# Patient Record
Sex: MALE | Race: WHITE | Employment: PART TIME | URBAN - METROPOLITAN AREA
[De-identification: names, ages, dates, MRNs, and addresses within clinical notes are randomized per-mention and may not be internally consistent; named-entity substitution may affect disease eponyms.]

---

## 2018-08-27 ENCOUNTER — OFFICE VISIT (OUTPATIENT)
Dept: FAMILY MEDICINE | Facility: CLINIC | Age: 53
End: 2018-08-27

## 2018-08-27 VITALS
WEIGHT: 172 LBS | BODY MASS INDEX: 24.62 KG/M2 | OXYGEN SATURATION: 97 % | TEMPERATURE: 98.4 F | SYSTOLIC BLOOD PRESSURE: 113 MMHG | HEART RATE: 93 BPM | HEIGHT: 70 IN | DIASTOLIC BLOOD PRESSURE: 71 MMHG

## 2018-08-27 DIAGNOSIS — Z12.11 SPECIAL SCREENING FOR MALIGNANT NEOPLASMS, COLON: ICD-10-CM

## 2018-08-27 DIAGNOSIS — Z11.59 NEED FOR HEPATITIS C SCREENING TEST: ICD-10-CM

## 2018-08-27 DIAGNOSIS — Z13.220 LIPID SCREENING: ICD-10-CM

## 2018-08-27 DIAGNOSIS — Z23 NEED FOR SHINGLES VACCINE: ICD-10-CM

## 2018-08-27 DIAGNOSIS — Z00.00 ROUTINE GENERAL MEDICAL EXAMINATION AT A HEALTH CARE FACILITY: Primary | ICD-10-CM

## 2018-08-27 LAB
ANION GAP SERPL CALCULATED.3IONS-SCNC: 8 MMOL/L (ref 3–14)
BUN SERPL-MCNC: 16 MG/DL (ref 7–30)
CALCIUM SERPL-MCNC: 9.4 MG/DL (ref 8.5–10.1)
CHLORIDE SERPL-SCNC: 102 MMOL/L (ref 94–109)
CHOLEST SERPL-MCNC: 244 MG/DL
CO2 SERPL-SCNC: 30 MMOL/L (ref 20–32)
CREAT SERPL-MCNC: 0.92 MG/DL (ref 0.66–1.25)
GFR SERPL CREATININE-BSD FRML MDRD: 85 ML/MIN/1.7M2
GLUCOSE SERPL-MCNC: 103 MG/DL (ref 70–99)
HDLC SERPL-MCNC: 46 MG/DL
LDLC SERPL CALC-MCNC: 145 MG/DL
NONHDLC SERPL-MCNC: 198 MG/DL
POTASSIUM SERPL-SCNC: 4.2 MMOL/L (ref 3.4–5.3)
SODIUM SERPL-SCNC: 139 MMOL/L (ref 133–144)
TRIGL SERPL-MCNC: 264 MG/DL

## 2018-08-27 NOTE — PATIENT INSTRUCTIONS
Welcome to [facility], where we are committed to the art of inspired primary care.  Thank you for choosing us to be a part of your well-being.    The clinic is open Monday through Friday, 8:00 a.m. - 5:00 p.m., and Saturdays from 8:00 a.m. - 12:00 p.m.  After-hours questions are directed to our 24-hour nurse line, which can be reached by calling the clinic at [phone].  You can also contact the clinic through [employer], our online patient portal.  (Please allow 1-2 business days for a response via [employer].)  If you are not already enrolled in [employer], access instructions are below.    If you need a refill on your prescription, please contact the pharmacy where you filled it, and they will contact our clinic with the details of what is needed.  If your prescription is a controlled substance, you will have a conversation with your provider to determine if you would like to  your prescription at the clinic or have it mailed to your pharmacy.  Please allow 2-3 business days for all refill requests to be handled.    We look forward to providing you with great care!  Please let me know if you have any questions.  Thank you for allowing me to participate in your care.  It was my pleasure meeting you.  Katherine Mathews PA-C    Preventive Health Recommendations  Male Ages 50 - 64    Yearly exam:             See your health care provider every year in order to  o   Review health changes.   o   Discuss preventive care.    o   Review your medicines if your doctor has prescribed any.     Have a cholesterol test every 5 years, or more frequently if you are at risk for high cholesterol/heart disease.     Have a diabetes test (fasting glucose) every three years. If you are at risk for diabetes, you should have this test more often.     Have a colonoscopy at age 50, or have a yearly FIT test (stool test). These exams will check for colon cancer.      Talk with your health care provider about  whether or not a prostate cancer screening test (PSA) is right for you.    You should be tested each year for STDs (sexually transmitted diseases), if you re at risk.     Shots: Get a flu shot each year. Get a tetanus shot every 10 years.     Nutrition:    Eat at least 5 servings of fruits and vegetables daily.     Eat whole-grain bread, whole-wheat pasta and brown rice instead of white grains and rice.     Get adequate Calcium and Vitamin D.     Lifestyle    Exercise for at least 150 minutes a week (30 minutes a day, 5 days a week). This will help you control your weight and prevent disease.     Limit alcohol to one drink per day.     No smoking.     Wear sunscreen to prevent skin cancer.     See your dentist every six months for an exam and cleaning.     See your eye doctor every 1 to 2 years.

## 2018-08-27 NOTE — LETTER
September 4, 2018      Remington Orr  [address]  [facility] [address]              Dear Remington,    Below are your recent lab results. Hepatitis C screen and basic metabolic profile look just fine.    Your lipid profile is high which may increase your risk for heart disease. Below is your cardiac risk score.  This is a calculated risk of developing a cardiac event over the next 10 years.  Though I do not recommend medication at this time I do recommend lifestyle modifications such as a heart healthy diet, regular exercise and weight management for control.  We should also check this again in 6-12 months. I have enclosed further information.    Please make an appointment in clinic if you would like to discuss this further.  Feel free to send me a [employer] message if you have questions as well.    Thank you for allowing me to participate in your care.  It was my pleasure meeting you.      Sincerely,          Diann Mathews PA-C      The 10-year ASCVD risk score (Didier MAHARAJ Jr, et al., 2013) is: 5.6%    Values used to calculate the score:      Age: 53 years      Sex: Male      Is Non- : No      Diabetic: No      Tobacco smoker: No      Systolic Blood Pressure: 113 mmHg      Is BP treated: No      HDL Cholesterol: 46 mg/dL      Total Cholesterol: 244 mg/dL    Results for orders placed or performed in visit on 08/27/18   Hepatitis C Screen Reflex to HCV RNA Quant and Genotype   Result Value Ref Range    Hepatitis C Antibody Nonreactive NR^Nonreactive   Basic metabolic panel   Result Value Ref Range    Sodium 139 133 - 144 mmol/L    Potassium 4.2 3.4 - 5.3 mmol/L    Chloride 102 94 - 109 mmol/L    Carbon Dioxide 30 20 - 32 mmol/L    Anion Gap 8 3 - 14 mmol/L    Glucose 103 (H) 70 - 99 mg/dL    Urea Nitrogen 16 7 - 30 mg/dL    Creatinine 0.92 0.66 - 1.25 mg/dL    GFR Estimate 85 >60 mL/min/1.7m2    GFR Estimate If Black >90 >60 mL/min/1.7m2    Calcium 9.4 8.5 - 10.1 mg/dL   Lipid  Profile   Result Value Ref Range    Cholesterol 244 (H) <200 mg/dL    Triglycerides 264 (H) <150 mg/dL    HDL Cholesterol 46 >39 mg/dL    LDL Cholesterol Calculated 145 (H) <100 mg/dL    Non HDL Cholesterol 198 (H) <130 mg/dL           High Cholesterol: Assessing Your Risk    Have you been told that your cholesterol is too high? If so, you could be heading for a heart attack, also known as acute myocardial infarction (AMI), or stroke. This is especially true if you have other risk factors for heart disease. Get smart about cholesterol and your heart disease risk. This sheet can help you understand your heart disease risk and how your cholesterol level affects it. Talk to your healthcare provider about how to get started controlling your cholesterol.  Why is high cholesterol a problem?  Blood cholesterol is a fatty substance. The body uses it to make membranes in cells and for hormone production. It travels through the bloodstream and is used by the tissues for normal function. When blood cholesterol is high, it forms plaque and causes inflammation. The plaque builds up in the walls of arteries (blood vessels that carry blood from the heart to the body). This narrows the opening for blood flow. Over time, the heart may not get enough oxygen. This can lead to coronary artery disease, heart attack, or stroke.  3 steps to assessing your risk  Step 1. Find your risk factors for heart disease and stroke  How your cholesterol numbers affect your heart health depends on other risk factors for heart attack and stroke. Check off each risk factor below that applies to you:    Are you a man 45 years old or older or a woman 55 years old or older?    Does your family have a history of heart problems before the age of 55 in male relatives or age 65 in female relatives? This includes heart attack, coronary heart disease, or atherosclerosis.    Do you have high blood pressure? Do you take medicine to treat high blood pressure?    Do  you smoke?    Do you have diabetes?    Do you exercise very little or not very often? Recommendations are for 30 minutes of exercise at least 5 days a week. If you are not doing cardiovascular exercise as often as these recommendations, it may not be enough and you may be at higher risk for elevated cholesterol and heart disease.    Do you eat a diet that is high in saturated or trans fats, cholesterol, sugar, or alcohol? You may be at increased risk for heart disease if you do not eat enough fruits, vegetables, lean meats and eat sugars or drink alcohol sparingly.    Have you been told you have high cholesterol? Do you take medicine to control your cholesterol?  Step 2. Test your cholesterol  Have your cholesterol tested every 5 years after the age of 20 and more often if you have risk factors. Cholesterol testing most often needs no preparation. Sometimes you may be asked to fast (not eat) before your test. A blood sample is taken and sent to a lab. There, the amount of cholesterol and triglyceride in your blood is measured. There are 2 types of cholesterol in the sample. The first is HDL ( good cholesterol ). The second is LDL ( bad cholesterol ). Cholesterol test results are most often shown as the total of HDL and LDL cholesterol numbers. You may also be told the separate HDL and LDL cholesterol results.  Fill in your numbers below.  HDL cholesterol:                           LDL cholesterol:                         Total cholesterol:                         Triglyceride:                           Step 3. Discuss the results with your healthcare provider   If your cholesterol levels are higher than normal, your healthcare provider will help you with steps to take to lower your levels. Steps may include lifestyle changes like diet, physical activity, and quitting smoking, and medicine to lower bad cholesterol levels.  If you have high cholesterol, you may need your cholesterol level tested more often to make  "sure your medicine and lifestyle changes are working to reduce your risks of having a heart attack or stroke.   Date Last Reviewed: 6/1/2016 2000-2017 [employer]. [address] [facility], [place] [address]. All rights reserved. This information is not intended as a substitute for professional medical care. Always follow your healthcare professional's instructions.      Controlling Your Cholesterol  Cholesterol is a waxy substance. It travels in your blood through the blood vessels. When you have high cholesterol, it can build up along the walls of the blood vessels. This makes the vessels narrower and decreases blood flow. You are then at greater risk of having a heart attack or a stroke.  Good and bad cholesterol  Lipids are fats, and blood is mostly water. Fat and water don't mix. So our bodies need lipoproteins (lipids inside a protein shell) to carry the lipids. The protein shell carries its lipids through the bloodstream. There are two main kinds of lipoproteins:    LDL (low-density lipoprotein) is known as \"bad cholesterol.\" It mainly carries cholesterol. It delivers this cholesterol to body cells. Excess LDL cholesterol will build up in artery walls. This increases your risk for heart disease and stroke.    HDL (high-density lipoprotein) is known as \"good cholesterol.\" This protein shell collects excess cholesterol that LDLs have left behind on blood vessel walls. That's why high levels of HDL cholesterol can decrease your risk of heart disease and stroke.  Controlling cholesterol levels  Total cholesterol includes LDL and HDL cholesterol, as well as other fats in the bloodstream. If your total cholesterol is high, follow the steps below to help lower your total cholesterol level:  Eat less unhealthy fat    Cut back on saturated fats and trans fats (also called hydrogenated) by selecting lean cuts of meat, low-fat dairy, and using oils instead of solid fats. Limit baked goods, processed " meats, and fried foods. A diet that s high in these fats increases your bad cholesterol. It's not enough to just cut back on foods containing cholesterol.    Eat about 2 servings of fish per week. Most fish contain omega-3 fatty acids. These help lower blood cholesterol.    Eat more whole grains and soluble fiber (such as oat bran). These lower overall cholesterol.  Be active    Choose an activity you enjoy. Walking, swimming, and riding a bike are some good ways to be active.    Start at a level where you feel comfortable. Increase your time and pace a little each week.    Work up to 30 to 40 minutes of moderate to high intensity physical activity at least 3 to 4 days per week.    Remember, some activity is better than none.    If you haven't been exercising regularly, start slowly. Check with your healthcare provider to make sure the exercise plan is right for you.  Quit smoking  Quitting smoking can improve your lipid levels. It also lowers your risk for heart disease and stroke.  Manage your weight  If you are overweight or obese, your healthcare provider will work with you to lose weight and lower your BMI (body mass index) to a normal or near-normal level. Making diet changes and increasing physical activity can help.  Take medicine as directed  Many people need medicine to get their LDL levels to a safe level. Medicine to lower cholesterol levels is effective and safe. Taking medicine is not a substitute for exercise or watching your diet! Your healthcare provider can tell you whether you might benefit from a cholesterol-lowering medicine.  Date Last Reviewed: 6/1/2017 2000-2017 [employer]. [address] [place] [address], [place] [address]. All rights reserved. This information is not intended as a substitute for professional medical care. Always follow your healthcare professional's instructions.

## 2018-08-27 NOTE — NURSING NOTE
Screening Questionnaire for Adult Immunization    Are you sick today?   No   Do you have allergies to medications, food, a vaccine component or latex?   No   Have you ever had a serious reaction after receiving a vaccination?   No   Do you have a long-term health problem with heart disease, lung disease, asthma, kidney disease, metabolic disease (e.g. diabetes), anemia, or other blood disorder?   No   Do you have cancer, leukemia, HIV/AIDS, or any other immune system problem?   No   In the past 3 months, have you taken medications that affect  your immune system, such as prednisone, other steroids, or anticancer drugs; drugs for the treatment of rheumatoid arthritis, Crohn s disease, or psoriasis; or have you had radiation treatments?   No   Have you had a seizure, or a brain or other nervous system problem?   No   During the past year, have you received a transfusion of blood or blood     products, or been given immune (gamma) globulin or antiviral drug?   No   For women: Are you pregnant or is there a chance you could become        pregnant during the next month?   No   Have you received any vaccinations in the past 4 weeks?   No     Immunization questionnaire answers were all negative.        Given by Danni Marquez. Patient instructed to remain in clinic for 15 minutes afterwards, and to report any adverse reaction to me immediately.       Screening performed by Danni Marquez on 8/27/2018 at 11:15 AM.

## 2018-08-27 NOTE — PROGRESS NOTES
SUBJECTIVE:   CC: Remington Orr is an 53 year old male who presents for preventative health visit. He is a new patient to our clinic and has been receiving health care [facility].  His last CPE was Never had one. He has the following concerns they would like addressed today:    1. Preventive care:  Generally feels very healthy. Has not been in to the doctor in a very long time.  [facility] Care Everywhere reviewed. He would like to have Cologard ordered.  2. Colon cancer screening:  Past provider would not order Cologard and he would like to do that for colon cancer screening.  Father  of colon cancer age 76.       Healthy Habits:    Do you get at least three servings of calcium containing foods daily (dairy, green leafy vegetables, etc.)? yes    Amount of exercise or daily activities, outside of work: 1 day(s) per week--rides mountain bike.  Very active for work does a lot of walking.    Problems taking medications regularly No    Medication side effects: No    Have you had an eye exam in the past two years? no    Do you see a dentist twice per year? no    Do you have sleep apnea, excessive snoring or daytime drowsiness?Snores regularly.  Does not wake up patient.  Wakes to urinate 2-3 times nightly. Drinks a lot of water throughout the day and while at night job.    Today's PHQ-2 Score:   PHQ-2 (  [employer]) 2018   Q1: Little interest or pleasure in doing things 0   Q2: Feeling down, depressed or hopeless 0   PHQ-2 Score 0     There are no active problems to display for this patient.      History reviewed. No pertinent past medical history.    Past Surgical History:   Procedure Laterality Date     wisdom teeth         Family History   Problem Relation Age of Onset     Colon Cancer Father 76     Alzheimer Disease Father        Social History   Substance Use Topics     Smoking status: Never Smoker     Smokeless tobacco: Never Used     Alcohol use 1.8 oz/week      3 Standard drinks or equivalent per week      Comment: 3 drinks a week       Social History     Social History Narrative    Works as a  for 20+ years at [employer]. He generally enjoys his work.    Lives with girlfriend for the most part.  Two cats.        Has a good support system.    Feels safe in all environments.    Wears seatbelt 100% of the time    Wears helmet while biking.    Dentist appointment view.     Eye doctor due.  Wears glasses when biking.    Denies history of abuse, past or present, physical, sexual or emotional.    08/27/18    Katherine Mathews PA-C           No current outpatient prescriptions on file.                           Last PSA: No results found for: PSA    Reviewed orders with patient. Reviewed health maintenance and updated orders accordingly - Yes    Reviewed and updated as needed this visit by clinical staff  Tobacco  Allergies  Meds  Problems  Med Hx  Surg Hx  Fam Hx  Soc Hx          Reviewed and updated as needed this visit by Provider  Tobacco  Allergies  Meds  Problems  Med Hx  Surg Hx  Fam Hx  Soc Hx             ROS:  CONSTITUTIONAL: NEGATIVE for fever, chills, change in weight  INTEGUMENTARY/SKIN: NEGATIVE for worrisome rashes, moles or lesions  EYES: NEGATIVE for vision changes or irritation  ENT: NEGATIVE for ear, mouth and throat problems  RESP: NEGATIVE for significant cough or SOB  CV: NEGATIVE for chest pain, palpitations or peripheral edema  GI: NEGATIVE for nausea, abdominal pain, heartburn, or change in bowel habits   male: negative for dysuria, hematuria, decreased urinary stream, erectile dysfunction, urethral discharge  MUSCULOSKELETAL: NEGATIVE for significant arthralgias or myalgia  NEURO: NEGATIVE for weakness, dizziness or paresthesias  ENDOCRINE: NEGATIVE for temperature intolerance, skin/hair changes  HEME/ALLERGY/IMMUNE: NEGATIVE for bleeding problems  PSYCHIATRIC: NEGATIVE for changes in mood or affect    OBJECTIVE:   /71  Pulse 93   "Temp 98.4  F (36.9  C) (Oral)  Ht 5' 10.2\" (178.3 cm)  Wt 172 lb (78 kg)  SpO2 97%  BMI 24.54 kg/m2  EXAM:  GENERAL: healthy, alert and no distress  EYES: Eyes grossly normal to inspection, PERRL and conjunctivae and sclerae normal  HENT: ear canals and TM's normal, nose and mouth without ulcers or lesions  NECK: no adenopathy, no asymmetry, masses, or scars and thyroid normal to palpation.  No bruits  RESP: lungs clear to auscultation - no rales, rhonchi or wheezes  CV: regular rate and rhythm, normal S1 S2, no S3 or S4, no murmur, click or rub, no peripheral edema and peripheral pulses strong  ABDOMEN: soft, nontender, no hepatosplenomegaly, no masses and bowel sounds normal  MS: no gross musculoskeletal defects noted, no clubbing, edema or cyanosis of extremities.  Pulses = and appropriate bilaterally to DP and PT  SKIN: no suspicious lesions or rashes  NEURO: Normal strength and tone, mentation intact and speech normal  PSYCH: mentation appears normal, affect normal/bright  LYMPH: no cervical, supraclavicular, axillary, or inguinal adenopathy      ASSESSMENT/PLAN:       ICD-10-CM    1. Routine general medical examination at a health care facility Z00.00 GASTROENTEROLOGY ADULT REF PROCEDURE ONLY     TDAP VACCINE (BOOSTRIX)     Basic metabolic panel     CANCELED: Lipid Panel ([facility])     CANCELED: Basic Metabolic Panel ([facility])     CANCELED: Prostate spec antigen screen   2. Need for shingles vaccine Z23    3. Lipid screening Z13.220 Lipid Profile   4. Special screening for malignant neoplasms, colon Z12.11 CANCELED: ZOSTER VACCINE RECOMBINANT ADJUVANTED IM NJX   5. Need for hepatitis C screening test Z11.59 Hepatitis C Screen Reflex to HCV RNA Quant and Genotype       COUNSELING:  Reviewed preventive health counseling, as reflected in patient instructions  Special attention given to:        Regular exercise       Healthy diet/nutrition       Vision screening       Immunizations  Vaccinated for: TDAP  " "And Shingrix recommended though we do not have in clinic due to national shortage.         Safe sex practices/STD prevention       Consider Hep C screening for patients born between 1945 and 1965       HIV screeninx in teen years, 1x in adult years, and at intervals if high risk       Colon cancer screening:  Long discussion.  Because he has a family history of colon cancer in a primary relative he does not meet criteria for Cologard screening.       Prostate cancer screening--discussed and patient would like to review infor given and screen later if he decides to.       Osteoporosis Prevention/Bone Health       Advance Care Planning  Return for physical in 1-2 years.  BP Readings from Last 1 Encounters:   18 113/71     Estimated body mass index is 24.54 kg/(m^2) as calculated from the following:    Height as of this encounter: 5' 10.2\" (178.3 cm).    Weight as of this encounter: 172 lb (78 kg).           reports that he has never smoked. He has never used smokeless tobacco.      Counseling Resources:  ATP IV Guidelines  Pooled Cohorts Equation Calculator  FRAX Risk Assessment  ICSI Preventive Guidelines  Dietary Guidelines for Americans, 2010  USDA's MyPlate  ASA Prophylaxis  Lung CA Screening    Diann Mathews PA-C  [facility]  "

## 2018-08-27 NOTE — NURSING NOTE
"53 year old  Chief Complaint   Patient presents with     Physical     pt wants to discuss the difference between colonscopy and the cologuard       Blood pressure 113/71, pulse 93, temperature 98.4  F (36.9  C), temperature source Oral, height 5' 10.2\" (178.3 cm), weight 172 lb (78 kg), SpO2 97 %. Body mass index is 24.54 kg/(m^2).  Patient Active Problem List   Diagnosis     Neoplasm of uncertain behavior of skin     Angioma of skin     Seborrheic keratosis       Wt Readings from Last 2 Encounters:   08/27/18 172 lb (78 kg)     BP Readings from Last 3 Encounters:   08/27/18 113/71         No current outpatient prescriptions on file.     No current facility-administered medications for this visit.        Social History   Substance Use Topics     Smoking status: Never Smoker     Smokeless tobacco: Never Used     Alcohol use Yes      Comment: 3 drinks a week       Health Maintenance Due   Topic Date Due     PHQ-2 Q1 YR  05/03/1976     TETANUS IMMUNIZATION (SYSTEM ASSIGNED)  05/03/1982     HIV SCREEN (SYSTEM ASSIGNED)  05/03/1982     HEPATITIS C SCREENING  05/03/1982     LIPID SCREEN Q5 YR MALE (SYSTEM ASSIGNED)  05/03/1999     COLON CANCER SCREEN (SYSTEM ASSIGNED)  05/03/2014       No results found for: PAP      August 27, 2018 9:55 AM  "

## 2018-08-27 NOTE — MR AVS SNAPSHOT
After Visit Summary   8/27/2018    Remington Orr    MRN: [id number]           Patient Information     Date Of Birth          1965        Visit Information        Provider Department      8/27/2018 10:00 AM Diann Mathews PA-C [facility]        Today's Diagnoses     Routine general medical examination at a health care facility    -  1    Need for shingles vaccine        Lipid screening        Special screening for malignant neoplasms, colon        Need for hepatitis C screening test          Care Instructions    Welcome to [facility], where we are committed to the art of inspired primary care.  Thank you for choosing us to be a part of your well-being.    The clinic is open Monday through Friday, 8:00 a.m. - 5:00 p.m., and Saturdays from 8:00 a.m. - 12:00 p.m.  After-hours questions are directed to our 24-hour nurse line, which can be reached by calling the clinic at [phone].  You can also contact the clinic through Conf[employer], our online patient portal.  (Please allow 1-2 business days for a response via Confabb.)  If you are not already enrolled in [employer], access instructions are below.    If you need a refill on your prescription, please contact the pharmacy where you filled it, and they will contact our clinic with the details of what is needed.  If your prescription is a controlled substance, you will have a conversation with your provider to determine if you would like to  your prescription at the clinic or have it mailed to your pharmacy.  Please allow 2-3 business days for all refill requests to be handled.    We look forward to providing you with great care!  Please let me know if you have any questions.  Thank you for allowing me to participate in your care.  It was my pleasure meeting you.  Katherine Mathews PA-C    Preventive Health Recommendations  Male Ages 50 - 64    Yearly exam:             See your health care provider every  year in order to  o   Review health changes.   o   Discuss preventive care.    o   Review your medicines if your doctor has prescribed any.     Have a cholesterol test every 5 years, or more frequently if you are at risk for high cholesterol/heart disease.     Have a diabetes test (fasting glucose) every three years. If you are at risk for diabetes, you should have this test more often.     Have a colonoscopy at age 50, or have a yearly FIT test (stool test). These exams will check for colon cancer.      Talk with your health care provider about whether or not a prostate cancer screening test (PSA) is right for you.    You should be tested each year for STDs (sexually transmitted diseases), if you re at risk.     Shots: Get a flu shot each year. Get a tetanus shot every 10 years.     Nutrition:    Eat at least 5 servings of fruits and vegetables daily.     Eat whole-grain bread, whole-wheat pasta and brown rice instead of white grains and rice.     Get adequate Calcium and Vitamin D.     Lifestyle    Exercise for at least 150 minutes a week (30 minutes a day, 5 days a week). This will help you control your weight and prevent disease.     Limit alcohol to one drink per day.     No smoking.     Wear sunscreen to prevent skin cancer.     See your dentist every six months for an exam and cleaning.     See your eye doctor every 1 to 2 years.            Follow-ups after your visit        Additional Services     GASTROENTEROLOGY ADULT REF PROCEDURE ONLY       Last Lab Result: Creatinine (mg/dL)       Date                     Value                 02/13/2011               1.06             ----------  There is no height or weight on file to calculate BMI.     Needed:  No  Language:  English    Patient will be contacted to schedule procedure.     Please be aware that coverage of these services is subject to the terms and limitations of your health insurance plan.  Call member services at your health plan with any  "benefit or coverage questions.  Any procedures must be performed at a [facility] facility OR coordinated by your clinic's referral office.    Please bring the following with you to your appointment:    (1) Any X-Rays, CTs or MRIs which have been performed.  Contact the facility where they were done to arrange for  prior to your scheduled appointment.    (2) List of current medications   (3) This referral request   (4) Any documents/labs given to you for this referral                  Who to contact     Please call your clinic at [phone] to:    Ask questions about your health    Make or cancel appointments    Discuss your medicines    Learn about your test results    Speak to your doctor            Additional Information About Your Visit        [employer] Information     [employer] is an electronic gateway that provides easy, online access to your medical records. With [employer], you can request a clinic appointment, read your test results, renew a prescription or communicate with your care team.     To sign up for [employer] visit the website at [email or web address]   You will be asked to enter the access code listed below, as well as some personal information. Please follow the directions to create your username and password.     Your access code is: [email or web address]  Expires: 2018  4:50 PM     Your access code will  in 90 days. If you need help or a new code, please contact your [facility] or call [phone] for assistance.        Care EveryWhere ID     This is your Care EveryWhere ID. This could be used by other organizations to access your [facility] medical records  [id number]        Your Vitals Were     Pulse Temperature Height Pulse Oximetry BMI (Body Mass Index)       93 98.4  F (36.9  C) (Oral) 5' 10.2\" (178.3 cm) 97% 24.54 kg/m2        Blood Pressure from Last 3 Encounters:   18 113/71    Weight from Last 3 Encounters:   18 172 lb (78 kg)         "      We Performed the Following     Basic Metabolic Panel ([facility])     GASTROENTEROLOGY ADULT REF PROCEDURE ONLY     Hepatitis C Screen Reflex to HCV RNA Quant and Genotype     Lipid Panel ([facility])     Prostate spec antigen screen     ZOSTER VACCINE RECOMBINANT ADJUVANTED IM NJX        Primary Care Provider Office Phone # Fax #    Diann Mathews PA-C [phone] [address] [place] [id number]        Equal Access to Services     VELVET MCKEON : Hadii aad ku hadasho Soomaali, waaxda luqadaha, qaybta kaalmada adeegyada, waxay idiin hayaan adeeg kharash la'jayson . So [facility] [phone].    ATENCIÓN: Si habla español, tiene a stratton disposición servicios gratuitos de asistencia lingüística. Blair al [phone].    We comply with applicable federal civil rights laws and [place] laws. We do not discriminate on the basis of race, color, national origin, age, disability, sex, sexual orientation, or gender identity.            Thank you!     Thank you for choosing [facility]  for your care. Our goal is always to provide you with excellent care. Hearing back from our patients is one way we can continue to improve our services. Please take a few minutes to complete the written survey that you may receive in the mail after your visit with us. Thank you!             Your Updated Medication List - Protect others around you: Learn how to safely use, store and throw away your medicines at www.disposemymeds.org.      Notice  As of 8/27/2018 10:43 AM    You have not been prescribed any medications.

## 2018-08-28 LAB — HCV AB SERPL QL IA: NONREACTIVE

## 2021-12-15 ENCOUNTER — LAB REQUISITION (OUTPATIENT)
Dept: LAB | Facility: CLINIC | Age: 56
End: 2021-12-15

## 2021-12-15 DIAGNOSIS — R26.89 OTHER ABNORMALITIES OF GAIT AND MOBILITY: ICD-10-CM

## 2021-12-15 DIAGNOSIS — Z13.1 ENCOUNTER FOR SCREENING FOR DIABETES MELLITUS: ICD-10-CM

## 2021-12-15 DIAGNOSIS — Z13.220 ENCOUNTER FOR SCREENING FOR LIPOID DISORDERS: ICD-10-CM

## 2021-12-15 LAB
ALBUMIN SERPL-MCNC: 4.3 G/DL (ref 3.5–5)
ALP SERPL-CCNC: 55 U/L (ref 45–120)
ALT SERPL W P-5'-P-CCNC: 18 U/L (ref 0–45)
ANION GAP SERPL CALCULATED.3IONS-SCNC: 13 MMOL/L (ref 5–18)
AST SERPL W P-5'-P-CCNC: 17 U/L (ref 0–40)
BILIRUB SERPL-MCNC: 0.6 MG/DL (ref 0–1)
BUN SERPL-MCNC: 15 MG/DL (ref 8–22)
CALCIUM SERPL-MCNC: 9.9 MG/DL (ref 8.5–10.5)
CHLORIDE BLD-SCNC: 103 MMOL/L (ref 98–107)
CHOLEST SERPL-MCNC: 184 MG/DL
CO2 SERPL-SCNC: 24 MMOL/L (ref 22–31)
CREAT SERPL-MCNC: 0.86 MG/DL (ref 0.7–1.3)
FOLATE SERPL-MCNC: 13.1 NG/ML
GFR SERPL CREATININE-BSD FRML MDRD: >90 ML/MIN/1.73M2
GLUCOSE BLD-MCNC: 119 MG/DL (ref 70–125)
HDLC SERPL-MCNC: 53 MG/DL
LDLC SERPL CALC-MCNC: 108 MG/DL
POTASSIUM BLD-SCNC: 4.5 MMOL/L (ref 3.5–5)
PROT SERPL-MCNC: 7.5 G/DL (ref 6–8)
SODIUM SERPL-SCNC: 140 MMOL/L (ref 136–145)
TRIGL SERPL-MCNC: 113 MG/DL
VIT B12 SERPL-MCNC: 360 PG/ML (ref 213–816)

## 2021-12-15 PROCEDURE — 80053 COMPREHEN METABOLIC PANEL: CPT | Performed by: FAMILY MEDICINE

## 2021-12-15 PROCEDURE — 82607 VITAMIN B-12: CPT | Performed by: FAMILY MEDICINE

## 2021-12-15 PROCEDURE — 82746 ASSAY OF FOLIC ACID SERUM: CPT | Performed by: FAMILY MEDICINE

## 2021-12-15 PROCEDURE — 80061 LIPID PANEL: CPT | Performed by: FAMILY MEDICINE

## 2021-12-23 ENCOUNTER — LAB REQUISITION (OUTPATIENT)
Dept: LAB | Facility: CLINIC | Age: 56
End: 2021-12-23

## 2021-12-23 DIAGNOSIS — Z12.5 ENCOUNTER FOR SCREENING FOR MALIGNANT NEOPLASM OF PROSTATE: ICD-10-CM

## 2021-12-23 DIAGNOSIS — Z11.59 ENCOUNTER FOR SCREENING FOR OTHER VIRAL DISEASES: ICD-10-CM

## 2021-12-23 PROCEDURE — G0103 PSA SCREENING: HCPCS | Performed by: FAMILY MEDICINE

## 2021-12-23 PROCEDURE — 86803 HEPATITIS C AB TEST: CPT | Performed by: FAMILY MEDICINE

## 2021-12-24 LAB
HCV AB SERPL QL IA: NONREACTIVE
PSA SERPL-MCNC: 0.46 UG/L (ref 0–3.5)

## 2022-01-14 ENCOUNTER — TRANSFERRED RECORDS (OUTPATIENT)
Dept: HEALTH INFORMATION MANAGEMENT | Facility: CLINIC | Age: 57
End: 2022-01-14

## 2022-02-04 ENCOUNTER — TRANSFERRED RECORDS (OUTPATIENT)
Dept: HEALTH INFORMATION MANAGEMENT | Facility: CLINIC | Age: 57
End: 2022-02-04

## 2022-02-04 ENCOUNTER — LAB REQUISITION (OUTPATIENT)
Dept: LAB | Facility: CLINIC | Age: 57
End: 2022-02-04

## 2022-02-04 DIAGNOSIS — G20.A1 PARKINSON'S DISEASE (H): ICD-10-CM

## 2022-02-04 LAB
T4 FREE SERPL-MCNC: 1.03 NG/DL (ref 0.7–1.8)
TSH SERPL DL<=0.005 MIU/L-ACNC: 1.26 UIU/ML (ref 0.3–5)

## 2022-02-04 PROCEDURE — 84443 ASSAY THYROID STIM HORMONE: CPT | Performed by: FAMILY MEDICINE

## 2022-02-04 PROCEDURE — 84439 ASSAY OF FREE THYROXINE: CPT | Performed by: FAMILY MEDICINE

## 2023-07-31 ENCOUNTER — TRANSFERRED RECORDS (OUTPATIENT)
Dept: HEALTH INFORMATION MANAGEMENT | Facility: CLINIC | Age: 58
End: 2023-07-31

## 2023-08-01 ENCOUNTER — TRANSCRIBE ORDERS (OUTPATIENT)
Dept: OTHER | Age: 58
End: 2023-08-01

## 2023-08-01 DIAGNOSIS — G20.A1 PARKINSON'S DISEASE (H): Primary | ICD-10-CM

## 2023-08-14 NOTE — TELEPHONE ENCOUNTER
Action 8/14/23 MV 2.36pm   Action Taken 1) records request faxed to [facility]     Action 9/11/23 MV 11.01am   Action Taken 2nd request faxed to [facility]     Action 10/2/23 MV 8.58am   Action Taken 1) [facility] records scanned in already  2) records and imaging request faxed to [facility]     Action 10/4/23 MV 3.03pm   Action Taken 1) [facility] records received and sent to scanning. No imaging.  2) [facility] report sent to scanning ; request faxed for images     Action 10/17/23 MV 9.49am   Action Taken [facility] images resolved in PACS       RECORDS RECEIVED FROM: internal   REASON FOR VISIT: Parkinson's disease    Date of Appt: 11/2/23   NOTES (FOR ALL VISITS) STATUS DETAILS   OFFICE NOTE from referring provider Received Dr John Liu @ [facility]:  7/31/23   OFFICE NOTE from other specialist [facility]:  9/6/23    Dr Paola Mg @ [facility]:  6/23/23  6/24/22  3/11/22  1/28/22  2/10/22    Dr Patrick Hooper @ [facility]:  3/9/23    Dr Madelyn Acosta @ [facility]:  12/23/21   MEDICATION LIST Received    IMAGING  (FOR ALL VISITS)     MRI (HEAD, NECK, SPINE) PACS [facility]:  MRI Brain and IAC 1/14/22

## 2023-09-06 ENCOUNTER — TRANSFERRED RECORDS (OUTPATIENT)
Dept: HEALTH INFORMATION MANAGEMENT | Facility: CLINIC | Age: 58
End: 2023-09-06
Payer: COMMERCIAL

## 2023-11-02 ENCOUNTER — PRE VISIT (OUTPATIENT)
Dept: NEUROLOGY | Facility: CLINIC | Age: 58
End: 2023-11-02

## 2023-11-02 ENCOUNTER — OFFICE VISIT (OUTPATIENT)
Dept: NEUROLOGY | Facility: CLINIC | Age: 58
End: 2023-11-02
Attending: FAMILY MEDICINE
Payer: COMMERCIAL

## 2023-11-02 VITALS
BODY MASS INDEX: 22.77 KG/M2 | HEART RATE: 72 BPM | DIASTOLIC BLOOD PRESSURE: 75 MMHG | SYSTOLIC BLOOD PRESSURE: 110 MMHG | OXYGEN SATURATION: 98 % | WEIGHT: 159.6 LBS

## 2023-11-02 DIAGNOSIS — G20.A2 PARKINSON'S DISEASE WITHOUT DYSKINESIA, WITH FLUCTUATING MANIFESTATIONS (H): Primary | ICD-10-CM

## 2023-11-02 PROCEDURE — 99417 PROLNG OP E/M EACH 15 MIN: CPT | Performed by: PSYCHIATRY & NEUROLOGY

## 2023-11-02 PROCEDURE — 99205 OFFICE O/P NEW HI 60 MIN: CPT | Mod: GC | Performed by: PSYCHIATRY & NEUROLOGY

## 2023-11-02 RX ORDER — CARBIDOPA AND LEVODOPA 50; 200 MG/1; MG/1
1 TABLET, EXTENDED RELEASE ORAL AT BEDTIME
Qty: 30 TABLET | Refills: 4 | Status: SHIPPED | OUTPATIENT
Start: 2023-11-02 | End: 2024-03-18

## 2023-11-02 RX ORDER — IBUPROFEN 200 MG
200-400 TABLET ORAL
COMMUNITY
End: 2024-07-16

## 2023-11-02 RX ORDER — CARBIDOPA AND LEVODOPA 25; 100 MG/1; MG/1
TABLET ORAL
COMMUNITY
End: 2024-03-05

## 2023-11-02 ASSESSMENT — UNIFIED PARKINSONS DISEASE RATING SCALE (UPDRS)
MOVEMENTS_INTERFERE_WITH_RATINGS: NO
FACIAL_EXPRESSION: (3) MASKED FACIES WITH LIPS PARTED SOME OF THE TIME WHEN THE MOUTH IS AT REST.
AXIAL_SCORE: 6
LEG_AGILITY_LEFT: (0) NORMAL: NO PROBLEMS.
HANDMOVEMENTS_LEFT: (1) SLIGHT: ANY OF THE FOLLOWING: A) THE REGULAR RHYTHM IS BROKEN WITH ONE WITH ONE OR TWO INTERRUPTIONS OR HESITATIONS OF THE MOVEMENT  B) SLIGHT SLOWING  C) THE AMPLITUDE DECREMENTS NEAR THE END OF THE 10 MOVEMENTS.
AMPLITUDE_RLE: (1) SLIGHT: < 1 CM IN MAXIMAL AMPLITUDE.
RIGIDITY_LUE: (1) SLIGHT: RIGIDITY ONLY DETECTED WITH ACTIVATION MANEUVER.
AMPLITUDE_LUE: (0) NORMAL: NO TREMOR.
TOTAL_SCORE_LEFT: 5
PRONATION_SUPINATION_RIGHT: (1) SLIGHT: ANY OF THE FOLLOWING: A) THE REGULAR RHYTHM IS BROKEN WITH ONE WITH ONE OR TWO INTERRUPTIONS OR HESITATIONS OF THE MOVEMENT  B) SLIGHT SLOWING  C) THE AMPLITUDE DECREMENTS NEAR THE END OF THE 10 MOVEMENTS.
TOTAL_SCORE: 27
ARISING_CHAIR: (0) NORMAL: NO PROBLEMS. ABLE TO ARISE QUICKLY WITHOUT HESITATION.
RIGIDITY_NECK: (1) SLIGHT: RIGIDITY ONLY DETECTED WITH ACTIVATION MANEUVER.
RIGIDITY_LLE: (0) NORMAL: NO RIGIDITY.
AMPLITUDE_RUE: (2) MILD: > 1 CM BUT < 3 CM IN MAXIMAL AMPLITUDE.
LEG_AGILITY_RIGHT: (1) SLIGHT: ANY OF THE FOLLOWING: A) THE REGULAR RHYTHM IS BROKEN WITH ONE WITH ONE OR TWO INTERRUPTIONS OR HESITATIONS OF THE MOVEMENT  B) SLIGHT SLOWING  C) THE AMPLITUDE DECREMENTS NEAR THE END OF THE 10 MOVEMENTS.
FINGER_TAPPING_LEFT: (1) SLIGHT: ANY OF THE FOLLOWING: A) THE REGULAR RHYTHM IS BROKEN WITH ONE WITH ONE OR TWO INTERRUPTIONS OR HESITATIONS OF THE MOVEMENT  B) SLIGHT SLOWING  C) THE AMPLITUDE DECREMENTS NEAR THE END OF THE 10 MOVEMENTS.
PARKINSONS_MEDS: ON
AMPLITUDE_LLE: (0) NORMAL: NO TREMOR.
SPONTANEITY_OF_MOVEMENT: (1) SLIGHT: SLIGHT GLOBAL SLOWNESS AND POVERTY OF SPONTANEOUS MOVEMENTS.
POSTURAL_STABILITY: (0) NORMAL: NO PROBLEMS. RECOVERS WITH ONE OR TWO STEPS.
PRONATION_SUPINATION_LEFT: (1) SLIGHT: ANY OF THE FOLLOWING: A) THE REGULAR RHYTHM IS BROKEN WITH ONE WITH ONE OR TWO INTERRUPTIONS OR HESITATIONS OF THE MOVEMENT  B) SLIGHT SLOWING  C) THE AMPLITUDE DECREMENTS NEAR THE END OF THE 10 MOVEMENTS.
TOETAPPING_LEFT: (1) SLIGHT: ANY OF THE FOLLOWING: A) THE REGULAR RHYTHM IS BROKEN WITH ONE WITH ONE OR TWO INTERRUPTIONS OR HESITATIONS OF THE MOVEMENT  B) SLIGHT SLOWING  C) THE AMPLITUDE DECREMENTS NEAR THE END OF THE 10 MOVEMENTS.
RIGIDITY_RUE: (2) MILD: RIGIDITY DETECTED WITHOUT THE ACTIVATION MANEUVER. FULL RANGE OF MOTION IS EASILY ACHIEVED.
SPEECH: (0) NORMAL: NO SPEECH PROBLEMS.
FINGER_TAPPING_RIGHT: (1) SLIGHT: ANY OF THE FOLLOWING: A) THE REGULAR RHYTHM IS BROKEN WITH ONE WITH ONE OR TWO INTERRUPTIONS OR HESITATIONS OF THE MOVEMENT  B) SLIGHT SLOWING  C) THE AMPLITUDE DECREMENTS NEAR THE END OF THE 10 MOVEMENTS.
DYSKINESIAS_PRESENT: NO
POSTURE: (0) NORMAL: NO PROBLEMS.
AMPLITUDE_LIP_JAW: (0) NORMAL: NO TREMOR.
GAIT: (1) SLIGHT: INDEPENDENT WALKING WITH MINOR GAIT IMPAIRMENT.
CONSTANCY_TREMOR_ATREST: (4) SEVERE: TREMOR AT REST IS PRESENT > 75% OF THE ENTIRE EXAMINATION PERIOD.
TOTAL_SCORE: 12
HANDMOVEMENTS_RIGHT: (1) SLIGHT: ANY OF THE FOLLOWING: A) THE REGULAR RHYTHM IS BROKEN WITH ONE WITH ONE OR TWO INTERRUPTIONS OR HESITATIONS OF THE MOVEMENT  B) SLIGHT SLOWING  C) THE AMPLITUDE DECREMENTS NEAR THE END OF THE 10 MOVEMENTS.
TOETAPPING_RIGHT: (2) MILD: ANY OF THE FOLLOWING: A) 3 TO 5 INTERRUPTIONS DURING TAPPING  B) MILD SLOWING  C) THE AMPLITUDE DECREMENTS MIDWAY IN THE 10-MOVEMENT SEQUENCE.
FREEZING_GAIT: (0) NORMAL: NO FREEZING.
RIGIDITY_RLE: (0) NORMAL: NO RIGIDITY.

## 2023-11-02 ASSESSMENT — PAIN SCALES - GENERAL: PAINLEVEL: NO PAIN (0)
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
## 2023-11-02 NOTE — PATIENT INSTRUCTIONS
You are having wearing off of your carbidopa/levodopa after about four hours. We recommend shortening the interval between your doses of carbidopa/levodopa. We also recommend adding a dose of long acting (controlled release) carbidopa/levodopa overnight to help your OFF symptoms overnight.     We recommend the following schedule.    8am 12pm 4pm 8pm Bedtime   Carbidopa/Levodopa  mg IR 1 1 1 1    Carbidopa/Levodopa  mg CR     1     Please pay attention to wearing off with this new medication regimen. We can always adjust as needed.     There is another medication that we can add to the carbidopa/levodopa if needed to make its effect last longer. This medication is called entacapone. If you are still noticing wearing off, we would likely recommend adding this medication to your regimen.     We strongly encourage you to try to get at least 20 minutes of exercises that raises your heart rate at least 5 days a week. Any type of exercise that raises your heart rate is great!       Parkinson's Disease Exercise Resources:    Zoe Nieto exercise class and support group (currently online): [phone] or [email or web address].    Https://www.powerforparkinsons.org/    Https://www.parkinson.org/[place]    https://www.apdaparkinson.org/community/[place]/local-resources-support/    Https://www.apdaparkinson.org/community/[place]/    Https://www.xnx0xtbx.org/    [facility] [place] Exercise Groups by [facility]  https://www.pmdalliance.org/resources/exercise-groups/[place]/    The Boxing club: Knock out Parkinson's boxing program    [place] - [phone]  [facility] - [phone]  [facility] [phone]  [place] - [phone]    [employer]  https://members.[employer].org/member-directory  Call [phone]    [employer]  https://tools.[employer]/    [employer] Listing of  Events  https://www.apdaparkinson.org/upcoming-events/?eType=EmailBlastContent&dMe=4f7c8580-mu00-68ae-y11y-61898d53sosj    [employer] Events  https://Spotie.org/events/    Dance for Parkinsons  https://danInstantMarketingfor"[employer]".org/resources/dance-at-home    Seated Strength and Cardio  Https://www.[employer].Z2/channel/PN93E9YhbMrgTJWIlo1rATbo/feed    [employer]  Https://www.[employer].Z2/channel/XE95H3FxlFypIBFSzi5qBPca/feed    Yoga for Parkinson's Disease  https://www.apdaparkinson.org/events/yoga-for-pd/?eType=EmailBlastContent&qLs=7g3a0425-cz34-84st-n47x-10379l13ptba    Call the Parkinson's Foundation Helpline 7.800.4PD.INFO (1-890.422.2126) for exercise resources in your area.     Free ebook about PD

## 2023-11-06 ENCOUNTER — PATIENT OUTREACH (OUTPATIENT)
Dept: CARE COORDINATION | Facility: CLINIC | Age: 58
End: 2023-11-06
Payer: COMMERCIAL

## 2023-11-16 NOTE — PROGRESS NOTES
Social Work - Telephone/MyChart message  [facility]  Data:   Patient Name: Remington Orr  Goes By: Remington    KIMBER/Age: 1965 (58 year old)      Referral Source: Dr Garcia    Reason for Referral: financial concerns, future planning    Intervention:  Messages left on Pt's voicemail on 23 and 23 offering SW consultation .   Plan:  will await patient's return phone call/message and provide assistance at that time.      KATHERIN Osei, [facility]    [facility]  [address] [place] [id number]    [phone]

## 2023-11-17 ENCOUNTER — TELEPHONE (OUTPATIENT)
Dept: NEUROLOGY | Facility: CLINIC | Age: 58
End: 2023-11-17
Payer: COMMERCIAL

## 2023-11-17 NOTE — TELEPHONE ENCOUNTER
Health Call Center    Phone Message    May a detailed message be left on voicemail: yes     Reason for Call: Medication Question or concern regarding medication   Prescription Clarification  Name of Medication: carbidopa-levodopa (SINEMET CR)  MG CR tablet [19181] (Order [id number])  Prescribing Provider: Dr. Jansen   Pharmacy:   [id number] [address]      What on the order needs clarification? Pt called to speak with care team about medication does.  Pt has been hesitant to take the new does and wanted to talk with team further about does increase.      Please call Pt back at [phone] to advise.      Action Taken: Message routed to:  Other: CS Neurology    Travel Screening: Not Applicable

## 2023-11-20 NOTE — TELEPHONE ENCOUNTER
Returned call to Remington Kirby to discuss Carbidopa/Levodopa CR.     Mr. Orr is now taking Carbidopa/Levodopa  mg IR TID and then taking the CR at night. Last week he was really struggling toward the end of the day. Monday through Thursday he had been doing a lot of work for him and noted that he was struggling at work in the evening.     On Friday, he switched to taking the Carbidopa/Levodopa  mg IR QID and the Carbidopa/Levodopa CR at night. He feels overall better.     He has noticed that his afternoon dose of Carbidopa/Levodopa doesn't tend to be as effective as his morning doses. He is continuing to avoid eating protein around the time of his doses. He has noticed that his afternoon doses of Carbidopa/Levodopa on days that he doesn't work that the medications seem to be better. He is mostly noticing that his back was getting stiff and sore toward the end of his shift at work and then would feel better when sitting and driving in the car home. He has not tried taking Carbidopa/Levodopa IR QID while working yet.     Advised that fatigue and any stress on the body will make Parkinson's symptoms worse. Advised targeting good sleep and taking breaks as needed. He will see how he does at work on full prescribed regimen..     Mr. Orr is worried that he is on a lot more Carbidopa/Levodopa than he had been in the spring. Mr. Orr had been told about dyskinesias with medications when he was initially diagnosed. Advised that he is still on a relatively low dose of medication and that dyskinesias tend to appear later on in the disease course with higher medication doses. Advised that if he notices dyskinesias on his current regimen, we can always adjust his regimen. He has not noticed any dyskinesias or side effects with the addition of Carbidopa/Levodopa CR.     All questions and concerns addressed.     Karen Jansen MD  Movement Disorder Fellow

## 2023-11-21 ENCOUNTER — PATIENT OUTREACH (OUTPATIENT)
Dept: CARE COORDINATION | Facility: CLINIC | Age: 58
End: 2023-11-21
Payer: COMMERCIAL

## 2023-11-22 NOTE — PROGRESS NOTES
Social Work Intervention  [facility]    Data/Intervention:    Patient Name:  Remington Orr  /Age:  1965 (58 year old)    Visit Type: telephone  Referral Source: Dr Garcia  Reason for Referral:  disability    Collaborated With:    -Remington and his SO Tiki    Psychosocial Information/Concerns:  Remington called me back with Tiki on speaker to discuss disability. He is working 2-4  5 hour shifts per week as a . 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 His balance is a major concern as well as fine motor use of his hands. He does get fatigued and has some hand tremors too. He hasn't noticed much in terms of cognitive impacts except he mentioned that he is having a harder time focusing/concentrating.   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 He is not working full time.  He does not have any benefits thru his job and has insurance thru [place] sure. He has a high deductible plan.     Intervention/Education/Resources Provided:  Educated about social security disability including the application process, the blue book listings that they can review and his ability to work part time and make less than about $1300/month.   Encouraged him to log in and get more information about what his income will be, etc    Also addressed doing a health care directive soon and will mail that along with a consent to communicate form.     Assessment/Plan:  They are going to consider info above and decide on next steps. Encouraged him to contact me again anytime as needed.     Provided patient/family with contact information and availability.    KATHERIN Osei, [facility]    [facility]  [address] [place] [address], [id number]  [place] [address]    [phone]

## 2023-12-18 ENCOUNTER — OFFICE VISIT (OUTPATIENT)
Dept: NEUROLOGY | Facility: CLINIC | Age: 58
End: 2023-12-18
Payer: COMMERCIAL

## 2023-12-18 VITALS — HEART RATE: 70 BPM | DIASTOLIC BLOOD PRESSURE: 67 MMHG | OXYGEN SATURATION: 99 % | SYSTOLIC BLOOD PRESSURE: 109 MMHG

## 2023-12-18 DIAGNOSIS — G20.A2 PARKINSON'S DISEASE WITHOUT DYSKINESIA, WITH FLUCTUATING MANIFESTATIONS (H): Primary | ICD-10-CM

## 2023-12-18 PROCEDURE — 99215 OFFICE O/P EST HI 40 MIN: CPT | Performed by: STUDENT IN AN ORGANIZED HEALTH CARE EDUCATION/TRAINING PROGRAM

## 2023-12-18 PROCEDURE — 99417 PROLNG OP E/M EACH 15 MIN: CPT | Performed by: STUDENT IN AN ORGANIZED HEALTH CARE EDUCATION/TRAINING PROGRAM

## 2023-12-18 ASSESSMENT — UNIFIED PARKINSONS DISEASE RATING SCALE (UPDRS)
RIGIDITY_LLE: (0) NORMAL: NO RIGIDITY.
MOVEMENTS_INTERFERE_WITH_RATINGS: NO
AMPLITUDE_LIP_JAW: (0) NORMAL: NO TREMOR.
TOTAL_SCORE: 11
FACIAL_EXPRESSION: (2) MILD: IN ADDITION TO DECREASED EYE-BLINK FREQUENCY, MASKED FACIES PRESENT IN THE LOWER FACE AS WELL, NAMELY FEWER MOVEMENTS AROUND THE MOUTH, SUCH AS LESS SPONTANEOUS SMILING, BUT LIPS NOT PARTED.
FREEZING_GAIT: (0) NORMAL: NO FREEZING.
GAIT: (0) NORMAL: NO PROBLEMS.
PRONATION_SUPINATION_RIGHT: (0) NORMAL: NO PROBLEMS.
RIGIDITY_RLE: (1) SLIGHT: RIGIDITY ONLY DETECTED WITH ACTIVATION MANEUVER.
POSTURAL_STABILITY: (0) NORMAL: NO PROBLEMS. RECOVERS WITH ONE OR TWO STEPS.
LEG_AGILITY_RIGHT: (1) SLIGHT: ANY OF THE FOLLOWING: A) THE REGULAR RHYTHM IS BROKEN WITH ONE WITH ONE OR TWO INTERRUPTIONS OR HESITATIONS OF THE MOVEMENT  B) SLIGHT SLOWING  C) THE AMPLITUDE DECREMENTS NEAR THE END OF THE 10 MOVEMENTS.
PRONATION_SUPINATION_LEFT: (0) NORMAL: NO PROBLEMS.
AMPLITUDE_RLE: (0) NORMAL: NO TREMOR.
AXIAL_SCORE: 5
AMPLITUDE_LLE: (0) NORMAL: NO TREMOR.
AMPLITUDE_LUE: (0) NORMAL: NO TREMOR.
TOETAPPING_RIGHT: (2) MILD: ANY OF THE FOLLOWING: A) 3 TO 5 INTERRUPTIONS DURING TAPPING  B) MILD SLOWING  C) THE AMPLITUDE DECREMENTS MIDWAY IN THE 10-MOVEMENT SEQUENCE.
CONSTANCY_TREMOR_ATREST: (3) MODERATE: TREMOR AT REST IS PRESENT 51-75% OF THE ENTIRE EXAMINATION PERIOD.
HANDMOVEMENTS_RIGHT: (1) SLIGHT: ANY OF THE FOLLOWING: A) THE REGULAR RHYTHM IS BROKEN WITH ONE WITH ONE OR TWO INTERRUPTIONS OR HESITATIONS OF THE MOVEMENT  B) SLIGHT SLOWING  C) THE AMPLITUDE DECREMENTS NEAR THE END OF THE 10 MOVEMENTS.
RIGIDITY_LUE: (2) MILD: RIGIDITY DETECTED WITHOUT THE ACTIVATION MANEUVER. FULL RANGE OF MOTION IS EASILY ACHIEVED.
TOTAL_SCORE: 23
FINGER_TAPPING_LEFT: (1) SLIGHT: ANY OF THE FOLLOWING: A) THE REGULAR RHYTHM IS BROKEN WITH ONE WITH ONE OR TWO INTERRUPTIONS OR HESITATIONS OF THE MOVEMENT  B) SLIGHT SLOWING  C) THE AMPLITUDE DECREMENTS NEAR THE END OF THE 10 MOVEMENTS.
RIGIDITY_RUE: (2) MILD: RIGIDITY DETECTED WITHOUT THE ACTIVATION MANEUVER. FULL RANGE OF MOTION IS EASILY ACHIEVED.
AMPLITUDE_RUE: (2) MILD: > 1 CM BUT < 3 CM IN MAXIMAL AMPLITUDE.
HANDMOVEMENTS_LEFT: (0) NORMAL: NO PROBLEMS.
SPEECH: (0) NORMAL: NO SPEECH PROBLEMS.
FINGER_TAPPING_RIGHT: (1) SLIGHT: ANY OF THE FOLLOWING: A) THE REGULAR RHYTHM IS BROKEN WITH ONE WITH ONE OR TWO INTERRUPTIONS OR HESITATIONS OF THE MOVEMENT  B) SLIGHT SLOWING  C) THE AMPLITUDE DECREMENTS NEAR THE END OF THE 10 MOVEMENTS.
PARKINSONS_MEDS: ON
SPONTANEITY_OF_MOVEMENT: (1) SLIGHT: SLIGHT GLOBAL SLOWNESS AND POVERTY OF SPONTANEOUS MOVEMENTS.
DYSKINESIAS_PRESENT: NO
TOTAL_SCORE_LEFT: 4
LEG_AGILITY_LEFT: (0) NORMAL: NO PROBLEMS.
POSTURE: (1) SLIGHT: NOT QUITE ERECT, BUT COULD BE NORMAL FOR OLDER PERSONS.
TOETAPPING_LEFT: (1) SLIGHT: ANY OF THE FOLLOWING: A) THE REGULAR RHYTHM IS BROKEN WITH ONE WITH ONE OR TWO INTERRUPTIONS OR HESITATIONS OF THE MOVEMENT  B) SLIGHT SLOWING  C) THE AMPLITUDE DECREMENTS NEAR THE END OF THE 10 MOVEMENTS.
RIGIDITY_NECK: (1) SLIGHT: RIGIDITY ONLY DETECTED WITH ACTIVATION MANEUVER.
ARISING_CHAIR: (0) NORMAL: NO PROBLEMS. ABLE TO ARISE QUICKLY WITHOUT HESITATION.

## 2023-12-18 NOTE — LETTER
2023         RE: Remington Orr  [address]  [place] [address]        Dear Colleague,    Thank you for referring your patient, Remington Orr, to the [facility] NEUROLOGY [facility]. Please see a copy of my visit note below.    Department of Neurology  Movement Disorders Division   New Patient Visit    Patient: Remington Orr   MRN: [id number]   : 1965   Date of Visit: 2023    CC: Parkinson's disease    HPI:  Mr. Orr is a 58 year old man with Parkinson's disease who presents to establish care for his Parkinson's disease. He was previously seen by Dr. Garcia and myself on 23 and has transferred care to the [facility] for location convenience.     Mr. Orr presents with his partner, Tiki.    He reports that overall mornings and afternoons seem to be better than in the evening. His tremor and balance can be more of a problem in the evening although he continues to function well at work which is typically in the evening. After work he feels the tremors more - often while flossing his teeth. Some nights at work are harder - he tends to cope well because he is a positive person. He does struggle at work sometimes and needs help from coworkers due to his Parkinson's symptoms. His back has been more sore at work which he thinks it could related to him having to focus more on balance.     He is doing Peddling for Parkinson's through the [employer] twice a week. He is doing [employer] once a week. He is trying to get exercise 5 days a week.    They spoke with Pauline Schultz, . They found this conversation helpful.     Parkinson Disease Motor Symptom Review:  Motor fluctuations: Tremor tends to be worse in the evening, especially when watching TV  Dyskinesia: Rare dyskinesias (more in the legs when standing, feet positioning at night), happening a couple times a week.   Wearing off: He notices wearing off 30-60 minutes prior to his next dose. He  mostly notices lack of balance. Tiki reports his walking slows down. Last 1-2 doses during the day don't seem to work as well. Overall wearing off is a little better since he was last seen. He tends to feel better when he is able to keep moving (I.e. while at work).   Freezing of gait: Denies freezing currently.   Dystonia: None - 4th and 5th digit not getting stuck as much.  Tremor: Right upper and lower extremity. Minimal tremor in the left. Worse when meds are wearing off. Tremor is only at rest.   Rigidity: Soreness in the mid back - possibly related to stiffness.   Bradykinesia: Endorses slowness, worse when the medications are wearing off      Parkinson's Disease Non-motor Symptom Review:  Depression - Mood has been pretty good. Denies feeling down, depressed.   Anxiety - Denies anxiety  Cognitive impairment -  No concerns  Sleep disturbances - He is sleeping better since the addition of controlled release Sinemet before bed. He can feel it wearing off in the morning - its harder to get up. He is moving around in bed better than he had been. He is able to get comfortable in bed much more quickly. Tiki reports dream enactment behavior is more frequent. She is not concerned about safety at this time.   GI symptoms - Mild constipation - largely able to manage with diet  Balance - No falls. Balance seems more off.   Pain - Occasional mid back pain in the evening, otherwise no pain elsewhere  Autonomic dysfunction - No lightheadedness or dizziness when going from sitting to standing  Hallucinations - Denies  Speech - No concerns  Swallowing - No concerns  Salivation - No concerns  Anosmia - No recent changes - has always been bad.   Dopamine dysregulation  - No new compulsions (gambling, over eating, medication overuse)     Driving:No accidents, near accidents, getting lost  Living situation: Lives with partner, Tiki. Lives in a rambler.    Medication compliance is good  ADL's: the patient is  independent    Continuing to work as a  at [employer]      Related Medications 7:30am 11:30am 3:30pm 7:30pm 10:30-11:30pm   Carbidopa/Levodopa  mg 1 1 1 1    Carbidopa/Levodopa  mg CR     1   Last taken at 11:10am    ROS:  All others negative except as listed above.    No past medical history on file.     Past Surgical History:   Procedure Laterality Date     wisdom teeth          Current Outpatient Medications   Medication Sig Dispense Refill     carbidopa-levodopa (SINEMET CR)  MG CR tablet Take 1 tablet by mouth at bedtime 30 tablet 4     carbidopa-levodopa (SINEMET)  MG tablet TAKE ONE TABLET BY MOUTH FOUR TIMES DAILY*       ibuprofen (ADVIL/MOTRIN) 200 MG tablet Take 200-400 mg by mouth         No Known Allergies     Family History   Problem Relation Age of Onset     Colon Cancer Father 76     Alzheimer Disease Father         Social History     Socioeconomic History     Marital status: Single     Spouse name: Not on file     Number of children: Not on file     Years of education: Not on file     Highest education level: Not on file   Occupational History     Occupation:    Tobacco Use     Smoking status: Never     Smokeless tobacco: Never   Substance and Sexual Activity     Alcohol use: Yes     Alcohol/week: 3.0 standard drinks of alcohol     Types: 3 Standard drinks or equivalent per week     Comment: 3 drinks a week     Drug use: No     Sexual activity: Yes     Partners: Female   Other Topics Concern     Not on file   Social History Narrative    Works as a  for 20+ years at [employer]. He generally enjoys his work.    Lives with girlfriend for the most part.  Two cats.        Has a good support system.    Feels safe in all environments.    Wears seatbelt 100% of the time    Wears helmet while biking.    Dentist appointment view.     Eye doctor due.  Wears glasses when biking.    Denies history of abuse, past or present, physical, sexual or emotional.    08/27/18    Katherine  Bisi PAULSON     Social Determinants of Health     Financial Resource Strain: Not on file   Food Insecurity: Not on file   Transportation Needs: Not on file   Physical Activity: Not on file   Stress: Not on file   Social Connections: Not on file   Interpersonal Safety: Not on file   Housing Stability: Not on file        PHYSICAL EXAM:  /67   Pulse 70   SpO2 99%     Gen: alert, active, attentive, appropriately groomed   HEENT: normocephalic, eyes open with no discharge, nares patent, oropharynx clear-no lesions  Chest: normal configuration, chest rise equal b/l, non labored breathing   CV: warm and well perfused  Pulmonary: normal WOB and conversational on RA  Extremities: no edema BUE/BLE  Psych: mood stable     NEURO:  MS: Alert and oriented to person, place, time, and situation.  Speech normal to comprehension and naming.  Recent and remote memory intact.  Attention and concentration normal.  Fund of knowledge normal.    CN:  II: Pupils equal, round, and reactive to light. VFF.  III, IV, VI: EOM normal range, no nystagmus.   V:  Facial sensation intact.  VII: Face symmetric at rest and with activation  VIII: Intact to finger rub bilaterally.  IX, X: Palate rise b/l, uvula midline.  No dysarthria.  XI: Trapezius 5/5 bilaterally.  XII: Tongue protrudes midline. No fasciculation or atrophy noted.    Motor:  Normal bulk throughout. Details of motor exam per UPDRS below.     R/L  Shoulder abd      5/5  Elbow flex  5/5  Elbow ext  5/5     5/5  IO   5/5    Hip flex  5/5  Knee flex  5/5  Knee ext  5/5  Dorsiflex  5/5  Plantar flex  5/5    Reflexes:  R/L  Biceps   2+/2+  Brachioradialis 2+/2+  Patellar  2+/2+  Achilles  2+/2+  Plantar   down/down   No clonus.  No frontal release signs.    Sensation:  Intact to LT, pinprick, vibration, temperature, and proprioception in all extremities.    Coordination:  FTN and HTN intact bilaterally.    Gait:  Normal base, stride length, turn. Minimal arm swing bilaterally R >  L.        12/18/2023     2:00 PM   UPDRS Motor Scale   Time: 14:02   Medication On   R Brain DBS: None   L Brain DBS: None   Dyskinesia (LID) No   Did LID interfere No   Speech 0   Facial Expression 2   Rigidity Neck 1   Rigidity RUE 2   Rigidity LUE 2   Rigidity RLE 1   Rigidity LLE 0   Finger Taps R 1   Finger Taps L 1   Hand Mvt R 1   Hand Mvt L 0   Pron-/Supinate R 0   Pron-/Supinate L 0   Toe Tap R 2   Toe Tap L 1   Leg Agility R 1   Leg Agility L 0   Arise From Chair 0   Gait 0   Gait Freezing 0   Postural Stability 0   Posture 1   Global Spont Mvt 1   Postural Tremor RUE 1   Postural Tremor LUE 0   Kinetic Tremor RUE 0   Kinetic Tremor LUE 0   Rest Tremor RUE 2   Rest Tremor LUE 0   Rest Tremor RLE 0   Rest Tremor LLE 0   Rest Tremor Lip/Jaw 0   Rest Tremor Constancy 3   Total Right 11   Total Left 4   Axial Total 5   Total 23       LABS:  Feb 2022  TSH 1.26  Free T4 1.03    IMAGING:  MRI brain Jan 2022  Mild chronic small vessel ischemic changes on personal and radiology review, otherwise unremarkable.       ASSESSMENT/PLAN:  Mr. Orr is a 58 year old man with Parkinson's disease who presents to establish care for his Parkinson's disease. He was seen by Dr. Garcia in November 2023 and decided to transition care to [facility] for proximity to his home. Parkinson's symptom onset was spring/summer of 2021 with gait disturbance and bradykinesia and he was officially diagnosed with Parkinson's disease in January 2022. At his appointment with Dr. Garcia he reported good efficacy of 1 tab Carbidopa/Levodopa QID but noted wearing off and bothersome symptoms overnight. We recommended shortening the interval between daytime doses and starting controlled release CD/LD at night. He reports CD/LD CR at night has been very helpful for his sleep. He continues to notice some medication wearing off between daytime doses and finds that his last two doses of the day are tend to be less effective. We discussed  potentially taking an addition 0.5-1 tablet of Carbidopa/Levodopa with his 3:30pm and/or 7:30pm doses but he would prefer to hold off on any changes at this time. We reviewed the importance of exercise in treating his Parkinson's disease, he is currently doing an excellent job remaining active. We discussed a physical therapy referral for his balance concerns, but he prefers to hold off on placing a referral at this time. He will contact our office if he would like this referral place.     His neurological exam remains consistent with the diagnosis of Parkinson's disease, with some improvement from when he was last seen. We reviewed the diagnosis of Parkinson's disease again including expected progression over time. 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 We discussed that, especially as Parkinson's disease progresses, we expect this work to be more difficult and would support him in seeking disability for Parkinson's disease.     With regards to dream enactment behavior, as this is interfering with his partner's sleep we have recommended starting melatonin and increasing until he and his partner see improvement. Currently dream enactment behavior does not pose a significant safety risk.     - Start melatonin nightly (3 or 5 mg tablets pending availability) with plan to increase every couple of days up to maximum dose of 15 mg nightly for dream enactment behaviors  - Consider addition of 0.5 tablet Carbidopa/Levodopa to 3:30pm and/or 7:30pm doses in the future, increase as tolerated and indicated  - No current changes to Carbidopa/Levodopa regimen   7:30am 11:30am 3:30pm 7:30pm 10:30-11:30pm   Carbidopa/Levodopa  mg 1 1 1 1    Carbidopa/Levodopa  mg CR     1     - Defer physical therapy referral for gait/balance training for now, Mr. Orr will reach out if  he would like this    Follow up in 4-6 months, 30 minutes    Time Spent: 83 minutes spent on the date of the encounter doing chart review, history and exam, documentation and further activities as noted above    Mr. Orr was seen and discussed with movement disorder attending, Dr. Shant Jansen MD  Movement Disorders Fellow     I, Julio César Gonsalez MD, personally interviewed, examined and evaluated this patient. I personally reviewed the vital signs, medications and pertinent labs/imaging. I discussed the patient with Dr. Jansen and agree with the assessment, examination and plan of care documented, with the additions and/or exceptions delineated below:      Overall doing well.  Seen not too long ago by Dr. Garcia and Dr. Jansen at a different location, he came here today because it is more convenient care is closer to his home.  Some wearing off in between doses, for which we adjusted his DBS settings, he is going to continue to follow with Dr. Jansen longitudinally for continuation of care.  Continue supportive care otherwise and he was encouraged to contact us back should there be any questions or concerns between now and next visit.    Attending attestation: Please credit billing to Dr. Jansen at this patient was seen in her continuity clinic.  She is a billing provider initiating credit for that.  Attestation is there just to document that I have seen the patient and I agree with the findings.      Again, thank you for allowing me to participate in the care of your patient.        Sincerely,        Karen Jansen MD

## 2023-12-18 NOTE — PROGRESS NOTES
Department of Neurology  Movement Disorders Division   New Patient Visit    Patient: Remington Orr   MRN: [id number]   : 1965   Date of Visit: 2023    CC: Parkinson's disease    HPI:  Mr. Orr is a 58 year old man with Parkinson's disease who presents to establish care for his Parkinson's disease. He was previously seen by Dr. Garcia and myself on 23 and has transferred care to the [facility] for location convenience.     Mr. Orr presents with his partner, Tiki.    He reports that overall mornings and afternoons seem to be better than in the evening. His tremor and balance can be more of a problem in the evening although he continues to function well at work which is typically in the evening. After work he feels the tremors more - often while flossing his teeth. Some nights at work are harder - he tends to cope well because he is a positive person. He does struggle at work sometimes and needs help from coworkers due to his Parkinson's symptoms. His back has been more sore at work which he thinks it could related to him having to focus more on balance.     He is doing Peddling for Parkinson's through the [employer] twice a week. He is doing [employer] once a week. He is trying to get exercise 5 days a week.    They spoke with Pauline Schultz, . They found this conversation helpful.     Parkinson Disease Motor Symptom Review:  Motor fluctuations: Tremor tends to be worse in the evening, especially when watching TV  Dyskinesia: Rare dyskinesias (more in the legs when standing, feet positioning at night), happening a couple times a week.   Wearing off: He notices wearing off 30-60 minutes prior to his next dose. He mostly notices lack of balance. Tiki reports his walking slows down. Last 1-2 doses during the day don't seem to work as well. Overall wearing off is a little better since he was last seen. He tends to feel better when he is able to keep moving (I.e. while at work).    Freezing of gait: Denies freezing currently.   Dystonia: None - 4th and 5th digit not getting stuck as much.  Tremor: Right upper and lower extremity. Minimal tremor in the left. Worse when meds are wearing off. Tremor is only at rest.   Rigidity: Soreness in the mid back - possibly related to stiffness.   Bradykinesia: Endorses slowness, worse when the medications are wearing off      Parkinson's Disease Non-motor Symptom Review:  Depression - Mood has been pretty good. Denies feeling down, depressed.   Anxiety - Denies anxiety  Cognitive impairment -  No concerns  Sleep disturbances - He is sleeping better since the addition of controlled release Sinemet before bed. He can feel it wearing off in the morning - its harder to get up. He is moving around in bed better than he had been. He is able to get comfortable in bed much more quickly. Tiki reports dream enactment behavior is more frequent. She is not concerned about safety at this time.   GI symptoms - Mild constipation - largely able to manage with diet  Balance - No falls. Balance seems more off.   Pain - Occasional mid back pain in the evening, otherwise no pain elsewhere  Autonomic dysfunction - No lightheadedness or dizziness when going from sitting to standing  Hallucinations - Denies  Speech - No concerns  Swallowing - No concerns  Salivation - No concerns  Anosmia - No recent changes - has always been bad.   Dopamine dysregulation  - No new compulsions (gambling, over eating, medication overuse)     Driving:No accidents, near accidents, getting lost  Living situation: Lives with partner, Tiki. Lives in a rambler.    Medication compliance is good  ADL's: the patient is independent    Continuing to work as a  at [employer] Medications 7:30am 11:30am 3:30pm 7:30pm 10:30-11:30pm   Carbidopa/Levodopa  mg 1 1 1 1    Carbidopa/Levodopa  mg CR     1   Last taken at 11:10am    ROS:  All others negative except as listed  above.    No past medical history on file.     Past Surgical History:   Procedure Laterality Date    wisdom teeth          Current Outpatient Medications   Medication Sig Dispense Refill    carbidopa-levodopa (SINEMET CR)  MG CR tablet Take 1 tablet by mouth at bedtime 30 tablet 4    carbidopa-levodopa (SINEMET)  MG tablet TAKE ONE TABLET BY MOUTH FOUR TIMES DAILY*      ibuprofen (ADVIL/MOTRIN) 200 MG tablet Take 200-400 mg by mouth         No Known Allergies     Family History   Problem Relation Age of Onset    Colon Cancer Father 76    Alzheimer Disease Father         Social History     Socioeconomic History    Marital status: Single     Spouse name: Not on file    Number of children: Not on file    Years of education: Not on file    Highest education level: Not on file   Occupational History    Occupation:    Tobacco Use    Smoking status: Never    Smokeless tobacco: Never   Substance and Sexual Activity    Alcohol use: Yes     Alcohol/week: 3.0 standard drinks of alcohol     Types: 3 Standard drinks or equivalent per week     Comment: 3 drinks a week    Drug use: No    Sexual activity: Yes     Partners: Female   Other Topics Concern    Not on file   Social History Narrative    Works as a  for 20+ years at [employer]. He generally enjoys his work.    Lives with girlfriend for the most part.  Two cats.        Has a good support system.    Feels safe in all environments.    Wears seatbelt 100% of the time    Wears helmet while biking.    Dentist appointment view.     Eye doctor due.  Wears glasses when biking.    Denies history of abuse, past or present, physical, sexual or emotional.    08/27/18    Katherine Mathews PA-C     Social Determinants of Health     Financial Resource Strain: Not on file   Food Insecurity: Not on file   Transportation Needs: Not on file   Physical Activity: Not on file   Stress: Not on file   Social Connections: Not on file   Interpersonal Safety: Not on file   Housing  Stability: Not on file        PHYSICAL EXAM:  /67   Pulse 70   SpO2 99%     Gen: alert, active, attentive, appropriately groomed   HEENT: normocephalic, eyes open with no discharge, nares patent, oropharynx clear-no lesions  Chest: normal configuration, chest rise equal b/l, non labored breathing   CV: warm and well perfused  Pulmonary: normal WOB and conversational on RA  Extremities: no edema BUE/BLE  Psych: mood stable     NEURO:  MS: Alert and oriented to person, place, time, and situation.  Speech normal to comprehension and naming.  Recent and remote memory intact.  Attention and concentration normal.  Fund of knowledge normal.    CN:  II: Pupils equal, round, and reactive to light. VFF.  III, IV, VI: EOM normal range, no nystagmus.   V:  Facial sensation intact.  VII: Face symmetric at rest and with activation  VIII: Intact to finger rub bilaterally.  IX, X: Palate rise b/l, uvula midline.  No dysarthria.  XI: Trapezius 5/5 bilaterally.  XII: Tongue protrudes midline. No fasciculation or atrophy noted.    Motor:  Normal bulk throughout. Details of motor exam per UPDRS below.     R/L  Shoulder abd      5/5  Elbow flex  5/5  Elbow ext  5/5     5/5  IO   5/5    Hip flex  5/5  Knee flex  5/5  Knee ext  5/5  Dorsiflex  5/5  Plantar flex  5/5    Reflexes:  R/L  Biceps   2+/2+  Brachioradialis 2+/2+  Patellar  2+/2+  Achilles  2+/2+  Plantar   down/down   No clonus.  No frontal release signs.    Sensation:  Intact to LT, pinprick, vibration, temperature, and proprioception in all extremities.    Coordination:  FTN and HTN intact bilaterally.    Gait:  Normal base, stride length, turn. Minimal arm swing bilaterally R > L.        12/18/2023     2:00 PM   UPDRS Motor Scale   Time: 14:02   Medication On   R Brain DBS: None   L Brain DBS: None   Dyskinesia (LID) No   Did LID interfere No   Speech 0   Facial Expression 2   Rigidity Neck 1   Rigidity RUE 2   Rigidity LUE 2   Rigidity RLE 1   Rigidity LLE 0    Finger Taps R 1   Finger Taps L 1   Hand Mvt R 1   Hand Mvt L 0   Pron-/Supinate R 0   Pron-/Supinate L 0   Toe Tap R 2   Toe Tap L 1   Leg Agility R 1   Leg Agility L 0   Arise From Chair 0   Gait 0   Gait Freezing 0   Postural Stability 0   Posture 1   Global Spont Mvt 1   Postural Tremor RUE 1   Postural Tremor LUE 0   Kinetic Tremor RUE 0   Kinetic Tremor LUE 0   Rest Tremor RUE 2   Rest Tremor LUE 0   Rest Tremor RLE 0   Rest Tremor LLE 0   Rest Tremor Lip/Jaw 0   Rest Tremor Constancy 3   Total Right 11   Total Left 4   Axial Total 5   Total 23       LABS:  Feb 2022  TSH 1.26  Free T4 1.03    IMAGING:  MRI brain Jan 2022  Mild chronic small vessel ischemic changes on personal and radiology review, otherwise unremarkable.       ASSESSMENT/PLAN:  Mr. Orr is a 58 year old man with Parkinson's disease who presents to establish care for his Parkinson's disease. He was seen by Dr. Garcia in November 2023 and decided to transition care to [facility] for proximity to his home. Parkinson's symptom onset was spring/summer of 2021 with gait disturbance and bradykinesia and he was officially diagnosed with Parkinson's disease in January 2022. At his appointment with Dr. Garcia he reported good efficacy of 1 tab Carbidopa/Levodopa QID but noted wearing off and bothersome symptoms overnight. We recommended shortening the interval between daytime doses and starting controlled release CD/LD at night. He reports CD/LD CR at night has been very helpful for his sleep. He continues to notice some medication wearing off between daytime doses and finds that his last two doses of the day are tend to be less effective. We discussed potentially taking an addition 0.5-1 tablet of Carbidopa/Levodopa with his 3:30pm and/or 7:30pm doses but he would prefer to hold off on any changes at this time. We reviewed the importance of exercise in treating his Parkinson's disease, he is currently doing an excellent job remaining  active. We discussed a physical therapy referral for his balance concerns, but he prefers to hold off on placing a referral at this time. He will contact our office if he would like this referral place.     His neurological exam remains consistent with the diagnosis of Parkinson's disease, with some improvement from when he was last seen. We reviewed the diagnosis of Parkinson's disease again including expected progression over time. 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 We discussed that, especially as Parkinson's disease progresses, we expect this work to be more difficult and would support him in seeking disability for Parkinson's disease.     With regards to dream enactment behavior, as this is interfering with his partner's sleep we have recommended starting melatonin and increasing until he and his partner see improvement. Currently dream enactment behavior does not pose a significant safety risk.     - Start melatonin nightly (3 or 5 mg tablets pending availability) with plan to increase every couple of days up to maximum dose of 15 mg nightly for dream enactment behaviors  - Consider addition of 0.5 tablet Carbidopa/Levodopa to 3:30pm and/or 7:30pm doses in the future, increase as tolerated and indicated  - No current changes to Carbidopa/Levodopa regimen   7:30am 11:30am 3:30pm 7:30pm 10:30-11:30pm   Carbidopa/Levodopa  mg 1 1 1 1    Carbidopa/Levodopa  mg CR     1     - Defer physical therapy referral for gait/balance training for now, Mr. Orr will reach out if he would like this    Follow up in 4-6 months, 30 minutes    Time Spent: 83 minutes spent on the date of the encounter doing chart review, history and exam, documentation and further activities as noted above    Mr. Orr was seen and discussed with movement disorder attending,   Shatn Jansen MD  Movement Disorders Fellow

## 2023-12-18 NOTE — PATIENT INSTRUCTIONS
You are doing a great job with exercise! Keep up the good work.     If you are happy with symptom control at this time, we do not need to make any adjustments to your medications. If you are more bothered by the tremor and slowness in the evening, we could consider adding an extra half or one tablet to your 3:30pm and/or 7:30pm doses of Carbidopa/Levodopa. Please reach out to our office if you would like to make this change.     People with Parkinson's disease are at risk of acting out their dreams. People can punch, kick, thrash in their sleep. This can increase your risk of falling out of bed as well as affect the sleep of your bed partner. We recommend starting with melatonin which can be bought over the counter at any pharmacy. Start by taking the lowest amount you find at the store (typically 3 mg or 5 mg) and try that for at least a couple of days. If you continue to have dream enactment, you can increase the dose by one pill every couple of days up until a maximum dose of 15 mg nightly.     If you are more concerned about your balance, we can place a referral to physical therapy.

## 2023-12-18 NOTE — NURSING NOTE
"Remington Orr is a 58 year old male who presents for:  Chief Complaint   Patient presents with    Referral     Medication seems to wear off later in the day with tremors on his right side and a slightly impaired sense of balance by about 5pm despite previous increase        Initial Vitals:  /67   Pulse 70   SpO2 99%  Estimated body mass index is 22.77 kg/m  as calculated from the following:    Height as of 8/27/18: 1.783 m (5' 10.2\").    Weight as of 11/2/23: 72.4 kg (159 lb 9.6 oz).. There is no height or weight on file to calculate BSA. BP completed using cuff size: suad Richard    "

## 2023-12-19 NOTE — PROGRESS NOTES
I, Julio César Gonsalez MD, personally interviewed, examined and evaluated this patient. I personally reviewed the vital signs, medications and pertinent labs/imaging. I discussed the patient with Dr. Jansen and agree with the assessment, examination and plan of care documented, with the additions and/or exceptions delineated below:      Overall doing well.  Seen not too long ago by Dr. Garcia and Dr. Jansen at a different location, he came here today because it is more convenient care is closer to his home.  Some wearing off in between doses, for which we adjusted his DBS settings, he is going to continue to follow with Dr. Jansen longitudinally for continuation of care.  Continue supportive care otherwise and he was encouraged to contact us back should there be any questions or concerns between now and next visit.    Attending attestation: Please credit billing to Dr. Jansen at this patient was seen in her continuity clinic.  She is a billing provider initiating credit for that.  Attestation is there just to document that I have seen the patient and I agree with the findings.

## 2023-12-22 PROBLEM — G20.A2 PARKINSON'S DISEASE WITHOUT DYSKINESIA, WITH FLUCTUATING MANIFESTATIONS (H): Status: ACTIVE | Noted: 2023-12-22

## 2024-02-29 ENCOUNTER — TELEPHONE (OUTPATIENT)
Dept: NEUROLOGY | Facility: CLINIC | Age: 59
End: 2024-02-29
Payer: COMMERCIAL

## 2024-02-29 DIAGNOSIS — G20.A2 PARKINSON'S DISEASE WITHOUT DYSKINESIA, WITH FLUCTUATING MANIFESTATIONS (H): ICD-10-CM

## 2024-02-29 NOTE — TELEPHONE ENCOUNTER
[facility] Call Center    Phone Message    May a detailed message be left on voicemail: yes     Reason for Call: Medication Question or concern regarding medication   Prescription Clarification  Name of Medication: carbidopa-levodopa (SINEMET CR)  MG  Prescribing Provider: Dr. Jansen    Pharmacy: N/A   What on the order needs clarification? Pt is requesting a call back as he would like to discuss increasing his medication listed above. Pt states he will ne be available on 3/1/24 from between 10:00am-1:00pm.     Please call Pt to discuss and advise at [phone]      Action Taken: Message routed to:  Other: [facility]     Travel Screening: Not Applicable

## 2024-03-04 NOTE — TELEPHONE ENCOUNTER
Called Remington Orr to discuss Carbidopa/Levodopa.     Call went to voicemail. Will attempt to call again.     Karen Jansen MD  Movement Disorders Fellow

## 2024-03-05 RX ORDER — CARBIDOPA AND LEVODOPA 25; 100 MG/1; MG/1
TABLET ORAL
Qty: 540 TABLET | Refills: 3 | Status: SHIPPED | OUTPATIENT
Start: 2024-03-05 | End: 2024-04-08

## 2024-03-05 NOTE — TELEPHONE ENCOUNTER
Called Remington Orr to discuss Carbidopa/Levodopa:     He reports he has been really feeling his Parkinson's disease symptoms. He had been trying to power through the symptoms and it has gotten worse. He feels like symptoms have been probably been building up and then in the past week things have gotten worse.     When he is takes his pill at 3:30 he will feel better for a while but then it will drop off.     Related Medications 7:30am 11:30am 3:30pm 7:30pm 10:30-11:30pm   Carbidopa/Levodopa  mg 1 1 1 1     Carbidopa/Levodopa  mg CR         1     He confirmed that he is taking medications as above.     He continues to find the Carbidopa/Levodopa CR helpful. He feels he is getting pretty good sleep.     He feels that currently he will suddenly notice that he is feeling ok on the medication and then that benefit won't last very well.     He has not started melatonin. His partner states that he has been calmer in sleep. He denies any safety concerns with dream enactment.     Based on his description, I worry that the medications are not reaching a good therapeutic level as opposed to consistent wearing off.     At our prior appointment we had discussed adding an additional 0.5-1 tablets to his 3:30 or 7:30 doses. He had not made this change as he would prefer to minimize medications as much as possible. Prior to this recent worsening of symptoms, he has noticed that symptoms tend to be a little worse for him. He felt a little worse during the day today but he didn't exercise today.      7:30am 11:30am 3:30pm 7:30pm 10:30-11:30pm   Carbidopa/Levodopa  mg 1 1 1.5 1.5     Carbidopa/Levodopa  mg CR         1     After one week advised that he can increase 3:30pm, 7:30pm doses to 2 tablets if he is still not feeling symptoms are well controlled.     Prescription updated to reflect increased dosage and sent to patient preferred pharmacy.     Karen Jansen MD  Movement Disorders Fellow

## 2024-03-18 DIAGNOSIS — G20.A2 PARKINSON'S DISEASE WITHOUT DYSKINESIA, WITH FLUCTUATING MANIFESTATIONS (H): ICD-10-CM

## 2024-03-18 RX ORDER — CARBIDOPA AND LEVODOPA 50; 200 MG/1; MG/1
1 TABLET, EXTENDED RELEASE ORAL AT BEDTIME
Qty: 30 TABLET | Refills: 0 | Status: SHIPPED | OUTPATIENT
Start: 2024-03-18 | End: 2024-04-25

## 2024-03-18 NOTE — TELEPHONE ENCOUNTER
Prescription approved per [facility] Refill Protocol.  Adore VAUGHAN RN, BSN  [facility] Neurology

## 2024-03-18 NOTE — TELEPHONE ENCOUNTER
Pending Prescriptions:                       Disp   Refills    carbidopa-levodopa (SINEMET CR)  MG*30 tab*4            Sig: Take 1 tablet by mouth at bedtime       Last Appt:12/18/2023  Next Appt:4/28/2024

## 2024-04-05 ENCOUNTER — TELEPHONE (OUTPATIENT)
Dept: NEUROLOGY | Facility: CLINIC | Age: 59
End: 2024-04-05
Payer: COMMERCIAL

## 2024-04-05 NOTE — TELEPHONE ENCOUNTER
Left voicemail for patient with appointment reminder for 4/8/24 with Dr Jansen at 3:30pm.    Advised patient to call [phone] with any questions.

## 2024-04-08 ENCOUNTER — OFFICE VISIT (OUTPATIENT)
Dept: NEUROLOGY | Facility: CLINIC | Age: 59
End: 2024-04-08
Payer: COMMERCIAL

## 2024-04-08 VITALS
OXYGEN SATURATION: 95 % | WEIGHT: 160 LBS | BODY MASS INDEX: 22.83 KG/M2 | SYSTOLIC BLOOD PRESSURE: 116 MMHG | DIASTOLIC BLOOD PRESSURE: 77 MMHG | HEART RATE: 69 BPM

## 2024-04-08 DIAGNOSIS — G20.A2 PARKINSON'S DISEASE WITHOUT DYSKINESIA, WITH FLUCTUATING MANIFESTATIONS (H): ICD-10-CM

## 2024-04-08 PROCEDURE — 99214 OFFICE O/P EST MOD 30 MIN: CPT | Performed by: STUDENT IN AN ORGANIZED HEALTH CARE EDUCATION/TRAINING PROGRAM

## 2024-04-08 RX ORDER — CARBIDOPA AND LEVODOPA 25; 100 MG/1; MG/1
TABLET ORAL
Qty: 540 TABLET | Refills: 3 | Status: SHIPPED | OUTPATIENT
Start: 2024-04-08 | End: 2024-06-24

## 2024-04-08 ASSESSMENT — UNIFIED PARKINSONS DISEASE RATING SCALE (UPDRS)
POSTURE: (1) SLIGHT: NOT QUITE ERECT, BUT COULD BE NORMAL FOR OLDER PERSONS.
CONSTANCY_TREMOR_ATREST: (3) MODERATE: TREMOR AT REST IS PRESENT 51-75% OF THE ENTIRE EXAMINATION PERIOD.
AMPLITUDE_RUE: (2) MILD: > 1 CM BUT < 3 CM IN MAXIMAL AMPLITUDE.
AMPLITUDE_LUE: (1) SLIGHT: < 1 CM IN MAXIMAL AMPLITUDE.
AMPLITUDE_RLE: (1) SLIGHT: < 1 CM IN MAXIMAL AMPLITUDE.
RIGIDITY_LLE: (0) NORMAL: NO RIGIDITY.
AMPLITUDE_LIP_JAW: (0) NORMAL: NO TREMOR.
HANDMOVEMENTS_LEFT: (1) SLIGHT: ANY OF THE FOLLOWING: A) THE REGULAR RHYTHM IS BROKEN WITH ONE WITH ONE OR TWO INTERRUPTIONS OR HESITATIONS OF THE MOVEMENT  B) SLIGHT SLOWING  C) THE AMPLITUDE DECREMENTS NEAR THE END OF THE 10 MOVEMENTS.
SPEECH: (0) NORMAL: NO SPEECH PROBLEMS.
RIGIDITY_NECK: (1) SLIGHT: RIGIDITY ONLY DETECTED WITH ACTIVATION MANEUVER.
RIGIDITY_RLE: (0) NORMAL: NO RIGIDITY.
PRONATION_SUPINATION_LEFT: (0) NORMAL: NO PROBLEMS.
MOVEMENTS_INTERFERE_WITH_RATINGS: NO
RIGIDITY_LUE: (1) SLIGHT: RIGIDITY ONLY DETECTED WITH ACTIVATION MANEUVER.
ARISING_CHAIR: (0) NORMAL: NO PROBLEMS. ABLE TO ARISE QUICKLY WITHOUT HESITATION.
DYSKINESIAS_PRESENT: NO
RIGIDITY_RUE: (2) MILD: RIGIDITY DETECTED WITHOUT THE ACTIVATION MANEUVER. FULL RANGE OF MOTION IS EASILY ACHIEVED.
TOTAL_SCORE: 21
PRONATION_SUPINATION_RIGHT: (0) NORMAL: NO PROBLEMS.
PARKINSONS_MEDS: ON
TOETAPPING_LEFT: (0) NORMAL: NO PROBLEMS.
LEG_AGILITY_LEFT: (0) NORMAL: NO PROBLEMS.
GAIT: (0) NORMAL: NO PROBLEMS.
HANDMOVEMENTS_RIGHT: (1) SLIGHT: ANY OF THE FOLLOWING: A) THE REGULAR RHYTHM IS BROKEN WITH ONE WITH ONE OR TWO INTERRUPTIONS OR HESITATIONS OF THE MOVEMENT  B) SLIGHT SLOWING  C) THE AMPLITUDE DECREMENTS NEAR THE END OF THE 10 MOVEMENTS.
TOTAL_SCORE_LEFT: 4
AXIAL_SCORE: 5
AMPLITUDE_LLE: (0) NORMAL: NO TREMOR.
TOTAL_SCORE: 9
FREEZING_GAIT: (0) NORMAL: NO FREEZING.
FINGER_TAPPING_LEFT: (1) SLIGHT: ANY OF THE FOLLOWING: A) THE REGULAR RHYTHM IS BROKEN WITH ONE WITH ONE OR TWO INTERRUPTIONS OR HESITATIONS OF THE MOVEMENT  B) SLIGHT SLOWING  C) THE AMPLITUDE DECREMENTS NEAR THE END OF THE 10 MOVEMENTS.
LEG_AGILITY_RIGHT: (0) NORMAL: NO PROBLEMS.
TOETAPPING_RIGHT: (1) SLIGHT: ANY OF THE FOLLOWING: A) THE REGULAR RHYTHM IS BROKEN WITH ONE WITH ONE OR TWO INTERRUPTIONS OR HESITATIONS OF THE MOVEMENT  B) SLIGHT SLOWING  C) THE AMPLITUDE DECREMENTS NEAR THE END OF THE 10 MOVEMENTS.
SPONTANEITY_OF_MOVEMENT: (1) SLIGHT: SLIGHT GLOBAL SLOWNESS AND POVERTY OF SPONTANEOUS MOVEMENTS.
FACIAL_EXPRESSION: (1) SLIGHT: MINIMAL MASKED FACIES MANIFESTED ONLY BY DECREASED FREQUENCY OF BLINKING.
POSTURAL_STABILITY: (1) SLIGHT: 3-5 STEPS, BUT RECOVERS UNAIDED.
FINGER_TAPPING_RIGHT: (1) SLIGHT: ANY OF THE FOLLOWING: A) THE REGULAR RHYTHM IS BROKEN WITH ONE WITH ONE OR TWO INTERRUPTIONS OR HESITATIONS OF THE MOVEMENT  B) SLIGHT SLOWING  C) THE AMPLITUDE DECREMENTS NEAR THE END OF THE 10 MOVEMENTS.

## 2024-04-08 NOTE — PATIENT INSTRUCTIONS
Your exam looks really good today and you have done a great job of staying active. Keep up the great work!    If you are noticing wearing off after 3 hours, there are a couple of things that we could try. You could try taking 1.5 tablets with each dose of medication. We could try shortening the interval between your doses to every three hours and add an extra dose. We can also add an additional medication called Entacapone (Comtan) to your doses of Carbidopa/Levodopa which can make it last longer. There is also a form of Carbidopa/Levodopa called Rytary that we could try prescribing as well although this can be very expensive. We can also try adding a dopamine agonist (pramipexole, ropinirole, rotigotine) or a different type of medication (rasagiline, selegiline). We do not need to make any of these changes today.     Please reach out if there is anything you need with regards to stopping work. Touching base with Pauline Coelho, our , would be helpful.     If you would like to work with physical therapy in the future, we can place a referral. They can work with you on balance if needed.

## 2024-04-08 NOTE — LETTER
2024         RE: Remington Orr  [address]  [place] [address]        Dear Colleague,    Thank you for referring your patient, Remington Orr, to the [facility]. Please see a copy of my visit note below.    Department of Neurology  Movement Disorders Division   Follow-up Note    Patient: Remington Orr   MRN: [id number]   : 1965   Date of Visit: 2024     Chief Complaint:  Remington Orr is a 58 year old male who returns to clinic for follow up of Parkinson's disease    Interval History:  Mr. Orr was last seen 23 at which time no changes were made to his medication regimen. He subsequently reached out and we opted to increase his two evening doses of Carbidopa/Levodopa to 1.5 tablets given more bothersome symptoms in the evening.     Mr. Orr presents by himself.     Since he was last seen, he has tried increasing two of his doses during the day to 1.5 tablets. On days that he doesn't work, he takes 1.5 tabs at 11:30am and 3:30pm. On days he works, he will take 1.5 tabs at 3:30pm and 7:30.     He tends to feel better in the morning. He wonders if this is left over effect from his CD/LD CR or because in the morning he tends to be more active.     When he has wearing off, it takes about an hour to fully kick in.     He was a little off on his schedule today after going out with friends following a boxing class. He took his second dose of the day at 11:50am. About an hour later he realized that he was feeling really good.     At work he doesn't tend to notice wearing off. When he is not working, he will notice some wearing off about an hour before his next dose.     We discussed options to treat wearing off including shortening the interval between doses, entacapone or trying to prescribe Rytary. He feels well enough on his current regimen that he doesn't want to make a change.     He is doing [employer] once a week as well as  Peddling for Parkinson's twice a week. There is a new  who is very motivating. He takes walks on his days off and is continuing to work. When he gets home from work he doesn't have his tremors as much. He wonders if taking more walks would be helpful.     Denies concerns about balance or falls. He is careful.     He is sleeping ok although the controlled release CD/LD isn't working quite as well through the night. He tends to wake at 6-6:30am. He doesn't notice tremor in the morning and he is generally able to fall back asleep until 7:30. Dream enactment has not been bad. Tiki hasn't said anything about it lately. He did not try melatonin because he didn't seem to be having issues with dream enactment.     He stays well hydrated and tends to drink water late into the evening, especially on days he works. He is not getting dizzy or lightheaded.     Constipation has been ok.     He has considered trying to quit work as it has been harder to do his job.     Current Medications:   Current Outpatient Medications   Medication Sig Dispense Refill     carbidopa-levodopa (SINEMET CR)  MG CR tablet Take 1 tablet by mouth at bedtime 30 tablet 0     carbidopa-levodopa (SINEMET)  MG tablet Take 1 tab at 7:30am, 1 tab at 11:30am, 1.5 tabs at 3:30pm, 1.5 tabs at 7:30pm. After one week increase to 1 tab at 7:30am, 1 tab at 11:30am, 2 tabs at 3:30pm, 2 tabs at 7:30pm 540 tablet 3     ibuprofen (ADVIL/MOTRIN) 200 MG tablet Take 200-400 mg by mouth (Patient not taking: Reported on 12/18/2023)           7:30am 11:30am 3:30pm 7:30pm 10:30-11:30pm   Carbidopa/Levodopa  mg 1 1-1.5 1-1.5 1-1.5     Carbidopa/Levodopa  mg CR         1        Allergies: has No Known Allergies.    Past Medical History:  Past Medical History:   Diagnosis Date     Parkinson's disease        Past Surgical History:  Past Surgical History:   Procedure Laterality Date     wisdom teeth         Social History:  Social History      Socioeconomic History     Marital status: Single   Occupational History     Occupation:    Tobacco Use     Smoking status: Never     Smokeless tobacco: Never   Substance and Sexual Activity     Alcohol use: Yes     Alcohol/week: 3.0 standard drinks of alcohol     Types: 3 Standard drinks or equivalent per week     Comment: 3 drinks a week     Drug use: No     Sexual activity: Yes     Partners: Female   Social History Narrative    Works as a  for 20+ years at [employer]. He generally enjoys his work.    Lives with girlfriend for the most part.  Two cats.        Has a good support system.    Feels safe in all environments.    Wears seatbelt 100% of the time    Wears helmet while biking.    Dentist appointment view.     Eye doctor due.  Wears glasses when biking.    Denies history of abuse, past or present, physical, sexual or emotional.    08/27/18    Katherine Mathews PA-C       Family History:  Family History   Problem Relation Age of Onset     Colon Cancer Father 76     Alzheimer Disease Father        Physical Exam:  The patient's  weight is 72.6 kg (160 lb). His blood pressure is 116/77 and his pulse is 69. His oxygen saturation is 95%.      Last dose at 3:30pm today    Neurological Examination:       4/8/2024     4:00 PM   UPDRS Motor Scale   Time: 16:38   Medication On   R Brain DBS: None   L Brain DBS: None   Dyskinesia (LID) No   Did LID interfere No   Speech 0   Facial Expression 1   Rigidity Neck 1   Rigidity RUE 2   Rigidity LUE 1   Rigidity RLE 0   Rigidity LLE 0   Finger Taps R 1   Finger Taps L 1   Hand Mvt R 1   Hand Mvt L 1   Pron-/Supinate R 0   Pron-/Supinate L 0   Toe Tap R 1   Toe Tap L 0   Leg Agility R 0   Leg Agility L 0   Arise From Chair 0   Gait 0   Gait Freezing 0   Postural Stability 1   Posture 1   Global Spont Mvt 1   Postural Tremor RUE 1   Postural Tremor LUE 0   Kinetic Tremor RUE 0   Kinetic Tremor LUE 0   Rest Tremor RUE 2   Rest Tremor LUE 1   Rest Tremor RLE 1   Rest  Tremor LLE 0   Rest Tremor Lip/Jaw 0   Rest Tremor Constancy 3   Total Right 9   Total Left 4   Axial Total 5   Total 21     Gait: Reduced arm swing with reemergent tremor in the right hand. Normal base and stride length. Normal turn.     Data Reviewed:   Reviewed February telephone encounters.     Impression:  Remington Orr is a 58 year old male with idiopathic Parkinson's disease. Overall symptoms are relatively stable although he does notice some wearing off between doses. Mr. Orr is not interested in making adjustments to his medication regimen today. We reviewed options to address wearing off in the future should this become more bothersome to him. He has done a phenomenal job of staying active. We encouraged continued exercise. He has been more seriously contemplating stopping work due to Parkinson's disease. He has met with Pauline Coelho, , in the past and will plan to touch base with her again. Advised that we are happy to provide whatever support is needed for disability. Dream enactment behavior has been under reasonably good control despite not starting melatonin. As it is not currently bothersome to either Mr. Orr or his partner, we advised he can continue off treatment for this.    Recommendations:     - Continue current medication regimen   - Discussed options to treat wearing off including shortening the dose between intervals, increasing medication dose, adding entacapone, dopamine agonist or MAO inhibitor, changing to Rytary   - Mr. Orr prefers to hold off on making changes today  - Mr. Orr is considering stopping work. He has spoken with Pauline Coelho, , in the past. Mr. Orr will reach out with any questions or concerns  - Discussed physical therapy referral for balance, Mr. Orr declined at this time.     Follow up in 6 months, 30 minutes    Time Spent: 37 minutes spent on the date of the encounter doing chart review, history and exam, documentation  and further activities as noted above    Mr. Orr was seen and discussed with movement disorders attending, Dr. Yvonne Jansen MD  Movement Disorders Fellow    CC: Parkinson's disease      Again, thank you for allowing me to participate in the care of your patient.        Sincerely,        Karen Jansen MD

## 2024-04-08 NOTE — PROGRESS NOTES
Department of Neurology  Movement Disorders Division   Follow-up Note    Patient: Remington Orr   MRN: [id number]   : 1965   Date of Visit: 2024     Chief Complaint:  Remington Orr is a 58 year old male who returns to clinic for follow up of Parkinson's disease    Interval History:  Mr. Orr was last seen 23 at which time no changes were made to his medication regimen. He subsequently reached out and we opted to increase his two evening doses of Carbidopa/Levodopa to 1.5 tablets given more bothersome symptoms in the evening.     Mr. Orr presents by himself.     Since he was last seen, he has tried increasing two of his doses during the day to 1.5 tablets. On days that he doesn't work, he takes 1.5 tabs at 11:30am and 3:30pm. On days he works, he will take 1.5 tabs at 3:30pm and 7:30.     He tends to feel better in the morning. He wonders if this is left over effect from his CD/LD CR or because in the morning he tends to be more active.     When he has wearing off, it takes about an hour to fully kick in.     He was a little off on his schedule today after going out with friends following a boxing class. He took his second dose of the day at 11:50am. About an hour later he realized that he was feeling really good.     At work he doesn't tend to notice wearing off. When he is not working, he will notice some wearing off about an hour before his next dose.     We discussed options to treat wearing off including shortening the interval between doses, entacapone or trying to prescribe Rytary. He feels well enough on his current regimen that he doesn't want to make a change.     He is doing [employer] once a week as well as Peddling for Parkinson's twice a week. There is a new  who is very motivating. He takes walks on his days off and is continuing to work. When he gets home from work he doesn't have his tremors as much. He wonders if taking more walks would be helpful.     Denies  concerns about balance or falls. He is careful.     He is sleeping ok although the controlled release CD/LD isn't working quite as well through the night. He tends to wake at 6-6:30am. He doesn't notice tremor in the morning and he is generally able to fall back asleep until 7:30. Dream enactment has not been bad. Tiki hasn't said anything about it lately. He did not try melatonin because he didn't seem to be having issues with dream enactment.     He stays well hydrated and tends to drink water late into the evening, especially on days he works. He is not getting dizzy or lightheaded.     Constipation has been ok.     He has considered trying to quit work as it has been harder to do his job.     Current Medications:   Current Outpatient Medications   Medication Sig Dispense Refill    carbidopa-levodopa (SINEMET CR)  MG CR tablet Take 1 tablet by mouth at bedtime 30 tablet 0    carbidopa-levodopa (SINEMET)  MG tablet Take 1 tab at 7:30am, 1 tab at 11:30am, 1.5 tabs at 3:30pm, 1.5 tabs at 7:30pm. After one week increase to 1 tab at 7:30am, 1 tab at 11:30am, 2 tabs at 3:30pm, 2 tabs at 7:30pm 540 tablet 3    ibuprofen (ADVIL/MOTRIN) 200 MG tablet Take 200-400 mg by mouth (Patient not taking: Reported on 12/18/2023)           7:30am 11:30am 3:30pm 7:30pm 10:30-11:30pm   Carbidopa/Levodopa  mg 1 1-1.5 1-1.5 1-1.5     Carbidopa/Levodopa  mg CR         1        Allergies: has No Known Allergies.    Past Medical History:  Past Medical History:   Diagnosis Date    Parkinson's disease        Past Surgical History:  Past Surgical History:   Procedure Laterality Date    wisdom teeth         Social History:  Social History     Socioeconomic History    Marital status: Single   Occupational History    Occupation:    Tobacco Use    Smoking status: Never    Smokeless tobacco: Never   Substance and Sexual Activity    Alcohol use: Yes     Alcohol/week: 3.0 standard drinks of alcohol     Types: 3  Standard drinks or equivalent per week     Comment: 3 drinks a week    Drug use: No    Sexual activity: Yes     Partners: Female   Social History Narrative    Works as a  for 20+ years at [employer]. He generally enjoys his work.    Lives with girlfriend for the most part.  Two cats.        Has a good support system.    Feels safe in all environments.    Wears seatbelt 100% of the time    Wears helmet while biking.    Dentist appointment view.     Eye doctor due.  Wears glasses when biking.    Denies history of abuse, past or present, physical, sexual or emotional.    08/27/18    Katherine Mathews PA-C       Family History:  Family History   Problem Relation Age of Onset    Colon Cancer Father 76    Alzheimer Disease Father        Physical Exam:  The patient's  weight is 72.6 kg (160 lb). His blood pressure is 116/77 and his pulse is 69. His oxygen saturation is 95%.      Last dose at 3:30pm today    Neurological Examination:       4/8/2024     4:00 PM   UPDRS Motor Scale   Time: 16:38   Medication On   R Brain DBS: None   L Brain DBS: None   Dyskinesia (LID) No   Did LID interfere No   Speech 0   Facial Expression 1   Rigidity Neck 1   Rigidity RUE 2   Rigidity LUE 1   Rigidity RLE 0   Rigidity LLE 0   Finger Taps R 1   Finger Taps L 1   Hand Mvt R 1   Hand Mvt L 1   Pron-/Supinate R 0   Pron-/Supinate L 0   Toe Tap R 1   Toe Tap L 0   Leg Agility R 0   Leg Agility L 0   Arise From Chair 0   Gait 0   Gait Freezing 0   Postural Stability 1   Posture 1   Global Spont Mvt 1   Postural Tremor RUE 1   Postural Tremor LUE 0   Kinetic Tremor RUE 0   Kinetic Tremor LUE 0   Rest Tremor RUE 2   Rest Tremor LUE 1   Rest Tremor RLE 1   Rest Tremor LLE 0   Rest Tremor Lip/Jaw 0   Rest Tremor Constancy 3   Total Right 9   Total Left 4   Axial Total 5   Total 21     Gait: Reduced arm swing with reemergent tremor in the right hand. Normal base and stride length. Normal turn.     Data Reviewed:   Reviewed February telephone  encounters.     Impression:  Remington Orr is a 58 year old male with idiopathic Parkinson's disease. Overall symptoms are relatively stable although he does notice some wearing off between doses. Mr. Orr is not interested in making adjustments to his medication regimen today. We reviewed options to address wearing off in the future should this become more bothersome to him. He has done a phenomenal job of staying active. We encouraged continued exercise. He has been more seriously contemplating stopping work due to Parkinson's disease. He has met with Pauline Coelho, , in the past and will plan to touch base with her again. Advised that we are happy to provide whatever support is needed for disability. Dream enactment behavior has been under reasonably good control despite not starting melatonin. As it is not currently bothersome to either Mr. Orr or his partner, we advised he can continue off treatment for this.    Recommendations:     - Continue current medication regimen   - Discussed options to treat wearing off including shortening the dose between intervals, increasing medication dose, adding entacapone, dopamine agonist or MAO inhibitor, changing to Rytary   - Mr. Orr prefers to hold off on making changes today  - Mr. Orr is considering stopping work. He has spoken with Pauline Coelho, , in the past. Mr. Orr will reach out with any questions or concerns  - Discussed physical therapy referral for balance, Mr. Orr declined at this time.     Follow up in 6 months, 30 minutes    Time Spent: 37 minutes spent on the date of the encounter doing chart review, history and exam, documentation and further activities as noted above    Mr. Orr was seen and discussed with movement disorders attending, Dr. Yvonne Jansen MD  Movement Disorders Fellow    CC: Parkinson's disease

## 2024-04-20 ENCOUNTER — HEALTH MAINTENANCE LETTER (OUTPATIENT)
Age: 59
End: 2024-04-20

## 2024-04-23 ENCOUNTER — PATIENT OUTREACH (OUTPATIENT)
Dept: CARE COORDINATION | Facility: CLINIC | Age: 59
End: 2024-04-23
Payer: COMMERCIAL

## 2024-04-23 DIAGNOSIS — G20.A2 PARKINSON'S DISEASE WITHOUT DYSKINESIA, WITH FLUCTUATING MANIFESTATIONS (H): Primary | ICD-10-CM

## 2024-04-23 NOTE — PROGRESS NOTES
Social Work Follow-Up  [facility]    Data/Intervention:  Patient Name:  Remington Orr  /Age:  1965 (58 year old)    Reason for Follow-Up:  questions about social security disability     Collaborated With:    -Remington    Intervention/Education/Resources Provided:  Remington had some questions about applying for social security disability. He is now considering applying soon. He continues to work part time and is making over what is allowed to also apply for disability.   Reviewed the Blue [employer] criteria and encouraged him to review it further prior to applying. He clearly wouldn't be able to work full time and making a living wage. He lives with his partner and they share expenses.     Assessment/Plan:  Pt to do some research in prep to apply and decrease his hours/income a bit also if he plans to continue to work. He is aware he can contact me again as needed.     Previously provided patient/family with writer's contact information and availability.       Pauline Schultz, MSW, [facility]    [facility]  [address] [place] [address], [address] [place] [id number]    [phone]

## 2024-04-25 ENCOUNTER — TELEPHONE (OUTPATIENT)
Dept: NEUROLOGY | Facility: CLINIC | Age: 59
End: 2024-04-25
Payer: COMMERCIAL

## 2024-04-25 DIAGNOSIS — G20.A2 PARKINSON'S DISEASE WITHOUT DYSKINESIA, WITH FLUCTUATING MANIFESTATIONS (H): ICD-10-CM

## 2024-04-25 RX ORDER — CARBIDOPA AND LEVODOPA 50; 200 MG/1; MG/1
1 TABLET, EXTENDED RELEASE ORAL AT BEDTIME
Qty: 30 TABLET | Refills: 5 | Status: SHIPPED | OUTPATIENT
Start: 2024-04-25

## 2024-04-25 NOTE — TELEPHONE ENCOUNTER
Per 4/8/24 OV note:  Recommendations:   - Continue current medication regimen   - Discussed options to treat wearing off including shortening the dose between intervals, increasing medication dose, adding entacapone, dopamine agonist or MAO inhibitor, changing to Rytary              - Mr. Orr prefers to hold off on making changes today      7:30am 11:30am 3:30pm 7:30pm 10:30-11:30pm   Carbidopa/Levodopa  mg 1 1-1.5 1-1.5 1-1.5     Carbidopa/Levodopa  mg CR         1     Advised to have 6 month follow up.     Will fill medication to last until follow up.   Pending Prescriptions:                       Disp   Refills    carbidopa-levodopa (SINEMET CR)  MG*30 tab*0            Sig: Take 1 tablet by mouth at bedtime    Adore VAUGHAN RN, BSN  [facility]

## 2024-04-25 NOTE — TELEPHONE ENCOUNTER
[facility] Call Center    Phone Message    May a detailed message be left on voicemail: yes     Reason for Call: Medication Refill Request    Has the patient contacted the pharmacy for the refill? Yes   Name of medication being requested: carbidopa-levodopa (SINEMET CR)  MG CR tablet  Provider who prescribed the medication: Karen Jansen  Pharmacy: [id number] [address]  Date medication is needed: 4 days left     Pt is requesting a refill for the medication listed above and has 4 days left. Please call pt with questions at # [phone].    Action Taken: Message routed to:  Other: [facility]     Travel Screening: Not Applicable

## 2024-06-25 ENCOUNTER — MYC MEDICAL ADVICE (OUTPATIENT)
Dept: NEUROLOGY | Facility: CLINIC | Age: 59
End: 2024-06-25
Payer: COMMERCIAL

## 2024-06-27 ENCOUNTER — TELEPHONE (OUTPATIENT)
Dept: NEUROLOGY | Facility: CLINIC | Age: 59
End: 2024-06-27
Payer: COMMERCIAL

## 2024-06-27 NOTE — TELEPHONE ENCOUNTER
MTM referral from: [facility] visit (referral by provider)    MTM referral outreach attempt #2 on June 27, 2024 at 9:12 AM      Outcome: Patient not reachable after several attempts, routed to Pharmacist Team/Provider as an FYI    Use hbc for the carrier/Plan on the flowsheet      [employer] Message Sent    Madelyn Mccallum CPhT  MTM        Patient called back and scheduled visit.

## 2024-06-28 NOTE — TELEPHONE ENCOUNTER
Pt called back.      He said he increased his 2 pm dose to 1.5 tablets, and didn't feel much improvement, so he then increased to 1.5 tablets at 5 PM yesterday, and he has been feeling much better today.  He is having some wearing off periods, but overall better.     He was wondering if he could just stay at this dose, and writer recommended he can stay at this, and if after a few days he wants to try 1.5 tablets at another dose, then he can, and follow up with MTM 7/16.    Pt verbalized understanding and acceptance of the plan. They had no further questions at this time.  Advised can call back to clinic at any time with concerns.     Adore VAUGHAN RN, BSN  [facility]

## 2024-06-28 NOTE — TELEPHONE ENCOUNTER
Attempted to call pt back.     [facility]     Adore VAUGHAN RN, BSN  [facility]

## 2024-07-16 ENCOUNTER — VIRTUAL VISIT (OUTPATIENT)
Dept: NEUROLOGY | Facility: CLINIC | Age: 59
End: 2024-07-16
Attending: PSYCHIATRY & NEUROLOGY
Payer: COMMERCIAL

## 2024-07-16 DIAGNOSIS — G20.A2 PARKINSON'S DISEASE WITHOUT DYSKINESIA, WITH FLUCTUATING MANIFESTATIONS (H): Primary | ICD-10-CM

## 2024-07-16 RX ORDER — LEVODOPA AND CARBIDOPA 95; 23.75 MG/1; MG/1
4 CAPSULE, EXTENDED RELEASE ORAL 3 TIMES DAILY
Qty: 360 CAPSULE | Refills: 11 | Status: SHIPPED | OUTPATIENT
Start: 2024-07-16

## 2024-07-16 NOTE — Clinical Note
7/16/2024       RE: Remington Orr  [address]  [place] [address]     Dear Colleague,    Thank you for referring your patient, Remington Orr, to the [facility] [place] [facility]. Please see a copy of my visit note below.    Medication Therapy Management (MTM) Encounter    ASSESSMENT:                            Medication Adherence/Access: {adherencechoices:824081}    ***:  ***      PLAN:                            ***    Follow-up: {followuptest2:939326}    SUBJECTIVE/OBJECTIVE:                          Remington Orr is a 59 year old male seen for an initial visit. He was referred to me from Dr. Russell.      Reason for visit: Initial Visit.    Allergies/ADRs: Reviewed in chart  Past Medical History: Reviewed in chart  Tobacco: He reports that he has never smoked. He has never used smokeless tobacco.  Alcohol: 1-3 beverages / week    Medication Adherence/Access: he will sometimes delay taking his medications on accident if he forgets to set his alarm as a reminder but does not forget to take a dose all together.     Parkinson's Disease:   Medication 7:30-8:00am  10:30-11:00am 1:30-2:00pm 4:30-5:00pm 7:30-8:00pm 11:30pm   Carbidopa/levodopa 25/100mg 1 tablet 1 tablet 1.5 tablets 1.5 tablets 1 tablet    Carbidopa/levodopa CR 50/200mg      1 tablet     About an hour before his next dose symptoms will come back if he takes 1 tablet   About 30mins-1 hours before next dose symptoms will come back with 1.5 tablets     Reports it is hard to describe. Sunday he had symptosm all day despite taking medications. Yesterday he felt good. Feels very random.     Tries to separate carbidopa/levodopa from protein. Sometimes he was not very successful. Thinks that ***     Mwf - workign out   *** ou could try taking 1.5 tablets with each dose of medication. We could try shortening the interval between your doses to every three hours  and add an extra dose. We can also add an additional medication called Entacapone (Comtan) to your doses of Carbidopa/Levodopa which can make it last longer. There is also a form of Carbidopa/Levodopa called Rytary that we could try prescribing as well although this can be very expensive. We can also try adding a dopamine agonist (pramipexole, ropinirole, rotigotine) or a different type of medication (rasagiline, selegiline).     ***:   ***    Today's Vitals: There were no vitals taken for this visit.  ----------------    I spent {[facility] total time 3:934982} with this patient today. All changes were made via collaborative practice agreement with Karen Russell. A copy of the visit note was provided to the patient's provider(s).    A summary of these recommendations was sent via [employer].    Zakia Santizo, Pharm.D., MPH  Medication Therapy Management Pharmacist   [facility]    Telemedicine Visit Details  Type of service:  Telephone visit  Start Time: 10:01 AM  End Time: {video/phone visit end time:021868}     Medication Therapy Recommendations  No medication therapy recommendations to display         Again, thank you for allowing me to participate in the care of your patient.      Sincerely,    Zakia Santizo [facility]

## 2024-07-16 NOTE — Clinical Note
He Jones! I met with Remington today. He has lots of questions and concerns. Seems like slowness and balance issues are coming back at random times throughout the day - lots of fluctuations. Let me know if you are okay with the plan of having him start working on  protein from his carbidopa/levodopa dose by about an hour (he thinks that is playing a big role in good and bad days) and trying to get him switched to Rytary. He is very worried and stressed about when he will be able to eat since he takes Sinemet 5 times per day.   I was thinking of starting with Rytary 95mg 4 caps three times daily if we can get coverage + the CR 50/200 at bedtime.   Thanks! Zakia Santizo, Pharm.D., MPH Medication Therapy Management Pharmacist  [facility]

## 2024-07-16 NOTE — PATIENT INSTRUCTIONS
"Recommendations from today's MTM visit:                                                    MTM (medication therapy management) is a service provided by a clinical pharmacist designed to help you get the most of out of your medicines.      Medication list updated   We talked about various options to help further control symptoms. We have decided to start working on ensuring you are  carbidopa/levodopa from protein by about an hour. We are also going to check on insurance coverage for Rytary which would be taking instead of the Sinemet and only 3 times per day. If Rytary is too expensive/not covered, we could try lowering the dose of carbidopa/levodopa and adding a medication called entacapone     Follow-up: 9/20/24 @ 9:30 am via phone call     It was great speaking with you today.  I value your experience and would be very thankful for your time in providing feedback in our clinic survey. In the next few days, you may receive an email or text message from [facility] [employer] with a link to a survey related to your  clinical pharmacist.\"     To schedule another MTM appointment, please call the clinic directly or you may call the MTM scheduling line at [phone].    My Clinical Pharmacist's contact information:                                                      Please feel free to contact me with any questions or concerns you have.      Zakia Santizo, Pharm.D., MPH  Medication Therapy Management Pharmacist   [facility]   "

## 2024-07-16 NOTE — PROGRESS NOTES
Medication Therapy Management (MTM) Encounter    ASSESSMENT:                            Medication Adherence/Access: No issues identified    Parkinson's Disease:  Patient having return of symptoms about an hour before next dose of carbidopa/levodopa.  Patient does feel that medication is less effective especially when he eats protein close to his carbidopa/levodopa dose.  Education provided to the patient that the medication is best taken on an empty stomach and at least 1 hour apart from protein as protein can decrease the absorption of the medication and therefore decrease its effectiveness.  Patient verbalizes understanding.  Patient hesitant to start a new medication such as entacapone or a dopamine agonist.  After discussion with neurologist and patient, will have patient switch to Rytary as this would only be dosed 3 times per day to start and will be easier for patient to separate dose from protein rich foods and will hopefully decreasing the wearing off between doses.     PLAN:                            Medication list updated   We talked about various options to help further control symptoms. We have decided to start working on ensuring you are  carbidopa/levodopa from protein by about an hour. We are also going to check on insurance coverage for Rytary which would be instead of the Sinemet and only 3 times per day. If Rytary is too expensive/not covered, we could try lowering the dose/ frequency of carbidopa/levodopa and adding a medication called entacapone     Follow-up: 9/20/24 @ 9:30 am via phone call     SUBJECTIVE/OBJECTIVE:                          Remington Orr is a 59 year old male seen for an initial visit. He was referred to me from Dr. Russell and Dr. Jansen.      Reason for visit: Initial Visit.    Allergies/ADRs: Reviewed in chart  Past Medical History: Reviewed in chart  Tobacco: He reports that he has never smoked. He has never used smokeless tobacco.  Alcohol: 1-3 beverages /  week    Medication Adherence/Access: he will sometimes delay taking his medications on accident if he forgets to set his alarm as a reminder but does not miss a dose ever.      Parkinson's Disease:   Medication 7:30-8:00am  10:30-11:00am 1:30-2:00pm 4:30-5:00pm 7:30-8:00pm 11:30pm   Carbidopa/levodopa 25/100mg 1 tablet 1 tablet 1.5 tablets 1.5 tablets 1 tablet    Carbidopa/levodopa CR 50/200mg      1 tablet     States his symptoms are hard to predict when they will come on. Notes that typically an hour before his next dose symptoms of slowness and balance issues return. These symptoms improve after he takes his next dose. Whether he takes 1 tablet or 1.5 tablets does not really change the fact that an hour before his next dose symptoms reappear. Did discuss options of adding an additional medication with Dr. Jansen. Patient is hesitant to do this. Does his best to separate dose of carbidopa/levodopa from protein but thinks that this is a contributing factor to when he has a bad day (increased symptoms).  He is the type of person who needs to eat throughout the day otherwise he gets crabby. He is worried about needing to skip meals given the frequency of carbidopa/levodopa during the day. Does workout three days a week.       Today's Vitals: There were no vitals taken for this visit.  ----------------    I spent 52 minutes with this patient today. All changes were made via verbal approval with Dr. Jansen. A copy of the visit note was provided to the patient's provider(s).    A summary of these recommendations was sent via [employer].    Zakia Santizo, Pharm.D., MPH  Medication Therapy Management Pharmacist   [facility]    Telemedicine Visit Details  Type of service:  Telephone visit  Start Time: 10:01 AM  End Time: 10:53 AM     Medication Therapy Recommendations  Parkinson's disease without dyskinesia, with fluctuating manifestations (H)    Current Medication: carbidopa-levodopa (SINEMET)  MG  tablet (Discontinued)   Rationale: More effective medication available - Ineffective medication - Effectiveness   Recommendation: Change Medication Formulation    Status: Accepted per Provider

## 2024-08-15 ENCOUNTER — TELEPHONE (OUTPATIENT)
Dept: PHARMACY | Facility: CLINIC | Age: 59
End: 2024-08-15
Payer: COMMERCIAL

## 2024-08-15 ENCOUNTER — TELEPHONE (OUTPATIENT)
Dept: NEUROLOGY | Facility: CLINIC | Age: 59
End: 2024-08-15
Payer: COMMERCIAL

## 2024-08-15 DIAGNOSIS — G20.A2 PARKINSON'S DISEASE WITHOUT DYSKINESIA, WITH FLUCTUATING MANIFESTATIONS (H): Primary | ICD-10-CM

## 2024-08-15 RX ORDER — CARBIDOPA AND LEVODOPA 25; 100 MG/1; MG/1
1.5 TABLET ORAL
Qty: 675 TABLET | Refills: 0 | Status: SHIPPED | OUTPATIENT
Start: 2024-08-15

## 2024-08-15 NOTE — CONFIDENTIAL NOTE
Dr. Jansen verbalizes refill of carbidopa/levodopa IR formulation while patient waits for Rytary to be delivered.     Toyin ChengD., MPH  Medication Therapy Management Pharmacist   [facility]

## 2024-08-15 NOTE — TELEPHONE ENCOUNTER
Writer spoke with patient about transitioning to Rytary.  Patient states they have not been able to be in contact with specialty pharmacy regarding approval/cost and would like Rx for carbidopa-levodopa IR sent to [facility] pharmacy in the meantime until decision can be made about changing to Rytary.    Provider gave approval for refill of short acting carbidopa levodopa.    Signed Prescriptions:                        Disp   Refills    carbidopa-levodopa (SINEMET)  MG tab*675 ta*0        Sig: Take 1.5 tablets by mouth 5 times daily  Authorizing Provider: CALEB RITCHIE  Ordering User: LORENZO VANCE      Rx sent to [facility] pharmacy.    Patient aware and will be in contact with pharmacy.    Lorenzo Vance, RN, BSN  [facility]

## 2024-08-15 NOTE — TELEPHONE ENCOUNTER
[facility] Call Center    Phone Message    May a detailed message be left on voicemail: yes     Reason for Call: Medication Refill Request    Has the patient contacted the pharmacy for the refill? Yes   Name of medication being requested: carbidopa-levodopa  mg  Provider who prescribed the medication:  Karen Jansen MD  Pharmacy: [id number] [address]   Date medication is needed: 08/15/2024   Please call caleb at [phone] with any questions.    Action Taken: Message routed to:  Other: [facility]    Travel Screening: Not Applicable     Date of Service:

## 2024-08-15 NOTE — CONFIDENTIAL NOTE
Patient contacted [facility] pharmacist for refill of carbidopa/levodopa IR. Reasonable to refill this formulation as patient has not yet received Rytary. PA was approved for Rytary and should be filled by [facility] pharmacy.  This request has been routed to his neurologist for approval.     Zakia Santizo, Pharm.D., MPH  Medication Therapy Management Pharmacist   [facility]

## 2024-08-15 NOTE — TELEPHONE ENCOUNTER
Good afternoon,    The patient called our scheduling line this afternoon. He is looking for a refill on his carbidopa-levodopa (Sinemet)  mg prescription. The patient stated that he is concerned because he will run out of the medication this weekend. I see that the prescription was discontinued in his chart. Patient said he never started taking the Rytary medication due to insurance issues. Please advise. Patient can be reached via [employer] or [employer] callback number [phone].    Thank you,  Madelyn Mccallum [facility]

## 2024-08-20 ENCOUNTER — TELEPHONE (OUTPATIENT)
Dept: NEUROLOGY | Facility: CLINIC | Age: 59
End: 2024-08-20
Payer: COMMERCIAL

## 2024-08-20 NOTE — CONFIDENTIAL NOTE
PharmD Outbound Call:     Reason for call:  missed MTM appointment    Call attempt: no answer, unable to leave . MyChart sent for patient to reschedule.     Zakia Santizo, Pharm.D., MPH  Medication Therapy Management Pharmacist   [facility]

## 2024-10-22 DIAGNOSIS — G20.A2 PARKINSON'S DISEASE WITHOUT DYSKINESIA, WITH FLUCTUATING MANIFESTATIONS (H): ICD-10-CM

## 2024-10-22 RX ORDER — CARBIDOPA AND LEVODOPA 50; 200 MG/1; MG/1
1 TABLET, EXTENDED RELEASE ORAL AT BEDTIME
Qty: 30 TABLET | Refills: 5 | Status: SHIPPED | OUTPATIENT
Start: 2024-10-22

## 2024-10-22 NOTE — TELEPHONE ENCOUNTER
Refill request for the following medication (s) listed below.    Pending Prescriptions:                       Disp   Refills    carbidopa-levodopa (SINEMET CR)  MG*30 tab*5            Sig: Take 1 tablet by mouth at bedtime.      Last office visit provider:  4/8/24  Next appointment scheduled: 2/10/25      Medication T'd for review and signature  Eric ANAND ATC on 10/22/2024 at 2:34 PM

## 2024-11-18 ENCOUNTER — OFFICE VISIT (OUTPATIENT)
Dept: NEUROLOGY | Facility: CLINIC | Age: 59
End: 2024-11-18
Payer: COMMERCIAL

## 2024-11-18 VITALS
HEART RATE: 83 BPM | DIASTOLIC BLOOD PRESSURE: 68 MMHG | OXYGEN SATURATION: 99 % | BODY MASS INDEX: 21.9 KG/M2 | SYSTOLIC BLOOD PRESSURE: 108 MMHG | WEIGHT: 153 LBS | HEIGHT: 70 IN

## 2024-11-18 DIAGNOSIS — G20.A2 PARKINSON'S DISEASE WITHOUT DYSKINESIA, WITH FLUCTUATING MANIFESTATIONS (H): ICD-10-CM

## 2024-11-18 PROCEDURE — 99214 OFFICE O/P EST MOD 30 MIN: CPT | Performed by: STUDENT IN AN ORGANIZED HEALTH CARE EDUCATION/TRAINING PROGRAM

## 2024-11-18 PROCEDURE — G2211 COMPLEX E/M VISIT ADD ON: HCPCS | Performed by: STUDENT IN AN ORGANIZED HEALTH CARE EDUCATION/TRAINING PROGRAM

## 2024-11-18 ASSESSMENT — UNIFIED PARKINSONS DISEASE RATING SCALE (UPDRS)
TOTAL_SCORE_LEFT: 8
AMPLITUDE_RUE: (2) MILD: > 1 CM BUT < 3 CM IN MAXIMAL AMPLITUDE.
PRONATION_SUPINATION_RIGHT: (0) NORMAL: NO PROBLEMS.
RIGIDITY_RLE: (0) NORMAL: NO RIGIDITY.
DYSKINESIAS_PRESENT: NO
FREEZING_GAIT: (0) NORMAL: NO FREEZING.
RIGIDITY_RUE: (2) MILD: RIGIDITY DETECTED WITHOUT THE ACTIVATION MANEUVER. FULL RANGE OF MOTION IS EASILY ACHIEVED.
FACIAL_EXPRESSION: (3) MASKED FACIES WITH LIPS PARTED SOME OF THE TIME WHEN THE MOUTH IS AT REST.
HANDMOVEMENTS_RIGHT: (1) SLIGHT: ANY OF THE FOLLOWING: A) THE REGULAR RHYTHM IS BROKEN WITH ONE WITH ONE OR TWO INTERRUPTIONS OR HESITATIONS OF THE MOVEMENT  B) SLIGHT SLOWING  C) THE AMPLITUDE DECREMENTS NEAR THE END OF THE 10 MOVEMENTS.
PARKINSONS_MEDS: ON
AMPLITUDE_LIP_JAW: (0) NORMAL: NO TREMOR.
CONSTANCY_TREMOR_ATREST: (4) SEVERE: TREMOR AT REST IS PRESENT > 75% OF THE ENTIRE EXAMINATION PERIOD.
SPONTANEITY_OF_MOVEMENT: (2) MILD: MILD GLOBAL SLOWNESS AND POVERTY OF SPONTANEOUS MOVEMENTS.
HANDMOVEMENTS_LEFT: (1) SLIGHT: ANY OF THE FOLLOWING: A) THE REGULAR RHYTHM IS BROKEN WITH ONE WITH ONE OR TWO INTERRUPTIONS OR HESITATIONS OF THE MOVEMENT  B) SLIGHT SLOWING  C) THE AMPLITUDE DECREMENTS NEAR THE END OF THE 10 MOVEMENTS.
SPEECH: (0) NORMAL: NO SPEECH PROBLEMS.
AMPLITUDE_RLE: (0) NORMAL: NO TREMOR.
TOETAPPING_LEFT: (0) NORMAL: NO PROBLEMS.
LEG_AGILITY_RIGHT: (1) SLIGHT: ANY OF THE FOLLOWING: A) THE REGULAR RHYTHM IS BROKEN WITH ONE WITH ONE OR TWO INTERRUPTIONS OR HESITATIONS OF THE MOVEMENT  B) SLIGHT SLOWING  C) THE AMPLITUDE DECREMENTS NEAR THE END OF THE 10 MOVEMENTS.
RIGIDITY_NECK: (2) MILD: RIGIDITY DETECTED WITHOUT THE ACTIVATION MANEUVER. FULL RANGE OF MOTION IS EASILY ACHIEVED.
RIGIDITY_LUE: (2) MILD: RIGIDITY DETECTED WITHOUT THE ACTIVATION MANEUVER. FULL RANGE OF MOTION IS EASILY ACHIEVED.
TOETAPPING_RIGHT: (1) SLIGHT: ANY OF THE FOLLOWING: A) THE REGULAR RHYTHM IS BROKEN WITH ONE WITH ONE OR TWO INTERRUPTIONS OR HESITATIONS OF THE MOVEMENT  B) SLIGHT SLOWING  C) THE AMPLITUDE DECREMENTS NEAR THE END OF THE 10 MOVEMENTS.
LEG_AGILITY_LEFT: (0) NORMAL: NO PROBLEMS.
MOVEMENTS_INTERFERE_WITH_RATINGS: NO
GAIT: (1) SLIGHT: INDEPENDENT WALKING WITH MINOR GAIT IMPAIRMENT.
POSTURE: (1) SLIGHT: NOT QUITE ERECT, BUT COULD BE NORMAL FOR OLDER PERSONS.
FINGER_TAPPING_LEFT: (1) SLIGHT: ANY OF THE FOLLOWING: A) THE REGULAR RHYTHM IS BROKEN WITH ONE WITH ONE OR TWO INTERRUPTIONS OR HESITATIONS OF THE MOVEMENT  B) SLIGHT SLOWING  C) THE AMPLITUDE DECREMENTS NEAR THE END OF THE 10 MOVEMENTS.
AMPLITUDE_LLE: (1) SLIGHT: < 1 CM IN MAXIMAL AMPLITUDE.
RIGIDITY_LLE: (0) NORMAL: NO RIGIDITY.
AMPLITUDE_LUE: (1) SLIGHT: < 1 CM IN MAXIMAL AMPLITUDE.
POSTURAL_STABILITY: (0) NORMAL: NO PROBLEMS. RECOVERS WITH ONE OR TWO STEPS.
TOTAL_SCORE: 31
ARISING_CHAIR: (0) NORMAL: NO PROBLEMS. ABLE TO ARISE QUICKLY WITHOUT HESITATION.
PRONATION_SUPINATION_LEFT: (0) NORMAL: NO PROBLEMS.
FINGER_TAPPING_RIGHT: (1) SLIGHT: ANY OF THE FOLLOWING: A) THE REGULAR RHYTHM IS BROKEN WITH ONE WITH ONE OR TWO INTERRUPTIONS OR HESITATIONS OF THE MOVEMENT  B) SLIGHT SLOWING  C) THE AMPLITUDE DECREMENTS NEAR THE END OF THE 10 MOVEMENTS.
TOTAL_SCORE: 10
AXIAL_SCORE: 9

## 2024-11-18 NOTE — PROGRESS NOTES
"Department of Neurology  Movement Disorders Division   Follow-up Note    Patient: Remington Orr   MRN: [id number]   : 1965   Date of Visit: 2024     Chief Complaint:  Remington Orr is a 59 year old male who returns to clinic for follow up of Parkinson's disease    Interval History:  Mr. Orr was last seen 24 at which time we discussed making small adjustments to his medications.     Mr. Orr presents by himself. His partner, Tiki, was present on speaker phone.     He met with Zakia Santizo PharmD in July. They ran Rytary through his insurance and he believes it was covered but he never tried taking this.     He feels that medications are wearing off sooner than previously.     Parkinson Disease Motor Symptom Review:  Motor fluctuations: Yes  Dyskinesia: Denies significant dyskinesias. Foot might want to move a little bit when standing - not sure if this is more ON or OFF.   Wearing off:  Currently feeling that he gets about an hour of benefit. They take about an hour to kick in and then start wearing off about an hour before his next dose.   Freezing of gait: Some freezing. Usually when he is in a tight space. Will shuffle his feet to get out off that space.   Tremor: Tremor has gotten a bit worse. Medication does help tone down tremor.    Rigidity: Mornings he tends to be stiffer - \"I just walk it out.\"   Bradykinesia: Feels he has been moving more slowly. When medications are on he feels pretty good.     Parkinson's Disease Non-motor Symptom Review:  Mood - Mood has been pretty good. Occasionally feeling down about Parkinson's disease.   Cognitive impairment -  He has noticed he can be more forgetful - he has to write down orders at work more. He can struggle with multitasking.   Sleep disturbances - Some dream enactment behavior   GI symptoms - Constipation is controlled with diet.   Balance - No falls but a lot of close calls. He worked with physical therapy at the time of his diagnosis. " "  Autonomic dysfunction - Careful when going from sitting to standing to avoid lightheadedness.   Hallucinations - No concern  Speech - \"I think it's maintaining fairly well.\" The count out loud at boxing and cycling  Swallowing - Denies dysphagia.  Salivation - Some excess saliva. Can happen during the day. Every once in a while has drooled if he is not thinking about it.     He does boxing and cycling for PD classes.     He is still working. Work has been a bit tougher since it seems that medications are wearing off. He hasn't applied for disability yet but is getting close to wear working is not a good idea at this point. They are going to work on this.     He is having more difficulty writing down orders. Coworkers are having to help him with things such as opening a bottle of wine at the table, carrying trays of food and drinks that they would not otherwise have to help other coworkers with doing.     He generally starts working around 5:30pm and will take 1-1.5 tabs of CD/LD at 5pm and then 8pm. It is harder to walk around later in the evening.     Current Medications:   Current Outpatient Medications   Medication Sig Dispense Refill    carbidopa-levodopa (SINEMET CR)  MG CR tablet Take 1 tablet by mouth at bedtime. 30 tablet 5    carbidopa-levodopa (SINEMET)  MG tablet Take 1.5 tablets by mouth 5 times daily 675 tablet 0    carbidopa-levodopa ER (RYTARY) 23.75-95 MG CPCR per CR capsule Take 4 capsules by mouth 3 times daily 360 capsule 11         8am 11am 2pm 5pm 8pm 11:30pm   Carbidopa/Levodopa  mg 1 1 1.5 1.5 1     Carbidopa/Levodopa  mg CR          1        Allergies: has No Known Allergies.    Past Medical History:  Past Medical History:   Diagnosis Date    Parkinson's disease (H)        Past Surgical History:  Past Surgical History:   Procedure Laterality Date    wisdom teeth         Social History:  Social History     Socioeconomic History    Marital status: Single   Occupational " "History    Occupation:    Tobacco Use    Smoking status: Never    Smokeless tobacco: Never   Substance and Sexual Activity    Alcohol use: Yes     Alcohol/week: 3.0 standard drinks of alcohol     Types: 3 Standard drinks or equivalent per week     Comment: 3 drinks a week    Drug use: No    Sexual activity: Yes     Partners: Female   Social History Narrative    Works as a  for 20+ years at [employer]. He generally enjoys his work.    Lives with girlfriend for the most part.  Two cats.        Has a good support system.    Feels safe in all environments.    Wears seatbelt 100% of the time    Wears helmet while biking.    Dentist appointment view.     Eye doctor due.  Wears glasses when biking.    Denies history of abuse, past or present, physical, sexual or emotional.    08/27/18    Katherine Mathews PA-C       Family History:  Family History   Problem Relation Age of Onset    Colon Cancer Father 76    Alzheimer Disease Father        Physical Exam:  The patient's  height is 1.783 m (5' 10.2\") and weight is 69.4 kg (153 lb). His blood pressure is 108/68 and his pulse is 83. His oxygen saturation is 99%.      Last dose at 11:20am today - medications are kicking in during this appointment.     Neurological Examination:       11/18/2024    12:00 PM   UPDRS Motor Scale   Time: 12:00   Medication On   R Brain DBS: None   L Brain DBS: None   Dyskinesia (LID) No   Did LID interfere No   Speech 0   Facial Expression 3   Rigidity Neck 2   Rigidity RUE 2   Rigidity LUE 2   Rigidity RLE 0   Rigidity LLE 0   Finger Taps R 1   Finger Taps L 1   Hand Mvt R 1   Hand Mvt L 1   Pron-/Supinate R 0   Pron-/Supinate L 0   Toe Tap R 1   Toe Tap L 0   Leg Agility R 1   Leg Agility L 0   Arise From Chair 0   Gait 1   Gait Freezing 0   Postural Stability 0   Posture 1   Global Spont Mvt 2   Postural Tremor RUE 1   Postural Tremor LUE 1   Kinetic Tremor RUE 1   Kinetic Tremor LUE 1   Rest Tremor RUE 2   Rest Tremor LUE 1   Rest Tremor RLE " 0   Rest Tremor LLE 1   Rest Tremor Lip/Jaw 0   Rest Tremor Constancy 4   Total Right 10   Total Left 8   Axial Total 9   Total 31     Gait: Reduced arm swing with reemergent tremor in the right hand. Slight shuffling, en bloc turn.     Data Reviewed:   7/16/24 note with Zakia Santizo PharmD  4/23/24 SW note from Pauline Schultz, [facility]    Impression:  Remington Orr is a 58 year old male with idiopathic Parkinson's disease. Overall, he has noticed progression of symptoms and particularly struggles with wearing off. He remains on a fairly low dose of carbidopa/levodopa. We discussed several ways to address wearing off. We will plan to start by increasing the dose to 1.5 tabs for all doses during the day to see if we can achieve a steadier concentration. Additional considerations include trial of Rytary vs addition of Entacapone. He has seen Zakia Santizo PharmD in the past. I sent a message to update her. He will try increased dose but is open to considering Rytary in the future.     Mr. Orr has continued to work as a  but is struggling to keep up with work and requiring significant assistance from coworkers. Discussed that Parkinson's disease is a progressive neurodegenerative disorder that particularly causes problems with mobility. I expect him to continue to struggle at work even if we are able to improve wearing off times. In particular, I am concerned about his ability to navigate tables with reported freezing. Multitasking and mobility are particularly challenging for people with Parkinson's disease and working as a  will be increasingly difficult. I support his pursuit of disability.    Encouraged him to continue doing a fantastic job staying active.     Recommendations:     - Increase immediate release CD/LD as below     8am 11am 2pm 5pm 8pm 11:30pm   Carbidopa/Levodopa  mg 1.5 1.5 1.5 1.5 1.5     Carbidopa/Levodopa  mg CR          1     - Discussed options to treat wearing off -  reviewed entacapone vs Rytary   - Will increase dose as above and revisit these options in a month or so   - Messaged Zakia Santizo, [facility] pharmacy to update   - Will plan for MT follow up between now and our next appointment  - Agree with pursuit of disability for Parkinson's disease diagnosis  - Encouraged continued activity    Follow up in 3 months, 30 minutes    Time Spent: 57 minutes spent on the date of the encounter doing chart review, history and exam, documentation and further activities as noted above    The longitudinal plan of care for the diagnosis(es)/condition(s) as documented were addressed during this visit. Due to the added complexity in care, I will continue to support Remington in the subsequent management and with ongoing continuity of care.    Karen Jansen MD  Movement Disorders Fellow    CC: Parkinson's disease

## 2024-11-18 NOTE — NURSING NOTE
Disp Refills Start End HAYDEE   DULoxetine (CYMBALTA) 30 MG capsule (Discontinued) 90 capsule 1 7/13/2016 8/10/2016 --   Sig: Takes daily with 60mg tab for total daily dose of 90mg.   Class: E-Prescribe   Order: [id number]   E-Prescribing Status: Receipt confirmed by pharmacy (7/13/2016  1:23 PM CDT)        "Remington Orr is a 59 year old male who presents for:  Chief Complaint   Patient presents with    Parkinson     Follow up Parkinson        Initial Vitals:  /68   Pulse 83   Ht 1.783 m (5' 10.2\")   Wt 69.4 kg (153 lb)   SpO2 99%   BMI 21.83 kg/m   Estimated body mass index is 21.83 kg/m  as calculated from the following:    Height as of this encounter: 1.783 m (5' 10.2\").    Weight as of this encounter: 69.4 kg (153 lb).. Body surface area is 1.85 meters squared. BP completed using cuff size: small regular    Jyotsna Rathai   " Patient notified. Patient verbalized understanding of information given.

## 2024-11-18 NOTE — PATIENT INSTRUCTIONS
"We can try increasing your Carbidopa/Levodopa to improve the wearing off you are experiencing. Increase your dose of Carbidopa/Levodopa to be 1.5 tabs to all of your doses. There is room to adjust further if needed..       8am 11am 2pm 5pm 8pm 11:30pm   Carbidopa/Levodopa  mg 1.5 1.5 1.5 1.5 1.5     Carbidopa/Levodopa  mg CR          1     I would encourage you think about trying Rytary which is the combination of long and short acting Carbidopa/Levodopa as this can also help with OFF times. I will message Zakia Santizo, EricD, to schedule follow up with you.    MTM scheduling line: [phone] or to schedule a MTM appt over [email or web address], click \"schedule an appointment\" under the visits tab at the top. Then choose Medication Therapy Management and follow the prompts to schedule.     Keep up the great job staying active!  I fully support you applying for disability, please reach out if there is anything our office can help with on that front. I would encourage you also to reach out to Pauline Sánchez with any questions.   "

## 2024-11-18 NOTE — LETTER
"2024      Remington Orr  [address] [place] [address]      Dear Colleague,    Thank you for referring your patient, Remington Orr, to the [facility]. Please see a copy of my visit note below.    Department of Neurology  Movement Disorders Division   Follow-up Note    Patient: Remington Orr   MRN: [id number]   : 1965   Date of Visit: 2024     Chief Complaint:  Remington Orr is a 59 year old male who returns to clinic for follow up of Parkinson's disease    Interval History:  Mr. Orr was last seen 24 at which time we discussed making small adjustments to his medications.     Mr. Orr presents by himself. His partner, Tiki, was present on speaker phone.     He met with Zkaia Santizo PharmD in July. They ran Rytary through his insurance and he believes it was covered but he never tried taking this.     He feels that medications are wearing off sooner than previously.     Parkinson Disease Motor Symptom Review:  Motor fluctuations: Yes  Dyskinesia: Denies significant dyskinesias. Foot might want to move a little bit when standing - not sure if this is more ON or OFF.   Wearing off:  Currently feeling that he gets about an hour of benefit. They take about an hour to kick in and then start wearing off about an hour before his next dose.   Freezing of gait: Some freezing. Usually when he is in a tight space. Will shuffle his feet to get out off that space.   Tremor: Tremor has gotten a bit worse. Medication does help tone down tremor.    Rigidity: Mornings he tends to be stiffer - \"I just walk it out.\"   Bradykinesia: Feels he has been moving more slowly. When medications are on he feels pretty good.     Parkinson's Disease Non-motor Symptom Review:  Mood - Mood has been pretty good. Occasionally feeling down about Parkinson's disease.   Cognitive impairment -  He has noticed he can be more forgetful - he has to write down orders at work more. " "He can struggle with multitasking.   Sleep disturbances - Some dream enactment behavior   GI symptoms - Constipation is controlled with diet.   Balance - No falls but a lot of close calls. He worked with physical therapy at the time of his diagnosis.   Autonomic dysfunction - Careful when going from sitting to standing to avoid lightheadedness.   Hallucinations - No concern  Speech - \"I think it's maintaining fairly well.\" The count out loud at boxing and cycling  Swallowing - Denies dysphagia.  Salivation - Some excess saliva. Can happen during the day. Every once in a while has drooled if he is not thinking about it.     He does boxing and cycling for PD classes.     He is still working. Work has been a bit tougher since it seems that medications are wearing off. He hasn't applied for disability yet but is getting close to wear working is not a good idea at this point. They are going to work on this.     He is having more difficulty writing down orders. Coworkers are having to help him with things such as opening a bottle of wine at the table, carrying trays of food and drinks that they would not otherwise have to help other coworkers with doing.     He generally starts working around 5:30pm and will take 1-1.5 tabs of CD/LD at 5pm and then 8pm. It is harder to walk around later in the evening.     Current Medications:   Current Outpatient Medications   Medication Sig Dispense Refill     carbidopa-levodopa (SINEMET CR)  MG CR tablet Take 1 tablet by mouth at bedtime. 30 tablet 5     carbidopa-levodopa (SINEMET)  MG tablet Take 1.5 tablets by mouth 5 times daily 675 tablet 0     carbidopa-levodopa ER (RYTARY) 23.75-95 MG CPCR per CR capsule Take 4 capsules by mouth 3 times daily 360 capsule 11         8am 11am 2pm 5pm 8pm 11:30pm   Carbidopa/Levodopa  mg 1 1 1.5 1.5 1     Carbidopa/Levodopa  mg CR          1        Allergies: has No Known Allergies.    Past Medical History:  Past Medical " "History:   Diagnosis Date     Parkinson's disease (H)        Past Surgical History:  Past Surgical History:   Procedure Laterality Date     wisdom teeth         Social History:  Social History     Socioeconomic History     Marital status: Single   Occupational History     Occupation:    Tobacco Use     Smoking status: Never     Smokeless tobacco: Never   Substance and Sexual Activity     Alcohol use: Yes     Alcohol/week: 3.0 standard drinks of alcohol     Types: 3 Standard drinks or equivalent per week     Comment: 3 drinks a week     Drug use: No     Sexual activity: Yes     Partners: Female   Social History Narrative    Works as a  for 20+ years at [employer]. He generally enjoys his work.    Lives with girlfriend for the most part.  Two cats.        Has a good support system.    Feels safe in all environments.    Wears seatbelt 100% of the time    Wears helmet while biking.    Dentist appointment view.     Eye doctor due.  Wears glasses when biking.    Denies history of abuse, past or present, physical, sexual or emotional.    08/27/18    Katherine Mathews PA-C       Family History:  Family History   Problem Relation Age of Onset     Colon Cancer Father 76     Alzheimer Disease Father        Physical Exam:  The patient's  height is 1.783 m (5' 10.2\") and weight is 69.4 kg (153 lb). His blood pressure is 108/68 and his pulse is 83. His oxygen saturation is 99%.      Last dose at 11:20am today - medications are kicking in during this appointment.     Neurological Examination:       11/18/2024    12:00 PM   UPDRS Motor Scale   Time: 12:00   Medication On   R Brain DBS: None   L Brain DBS: None   Dyskinesia (LID) No   Did LID interfere No   Speech 0   Facial Expression 3   Rigidity Neck 2   Rigidity RUE 2   Rigidity LUE 2   Rigidity RLE 0   Rigidity LLE 0   Finger Taps R 1   Finger Taps L 1   Hand Mvt R 1   Hand Mvt L 1   Pron-/Supinate R 0   Pron-/Supinate L 0   Toe Tap R 1   Toe Tap L 0   Leg Agility R 1 "   Leg Agility L 0   Arise From Chair 0   Gait 1   Gait Freezing 0   Postural Stability 0   Posture 1   Global Spont Mvt 2   Postural Tremor RUE 1   Postural Tremor LUE 1   Kinetic Tremor RUE 1   Kinetic Tremor LUE 1   Rest Tremor RUE 2   Rest Tremor LUE 1   Rest Tremor RLE 0   Rest Tremor LLE 1   Rest Tremor Lip/Jaw 0   Rest Tremor Constancy 4   Total Right 10   Total Left 8   Axial Total 9   Total 31     Gait: Reduced arm swing with reemergent tremor in the right hand. Slight shuffling, en bloc turn.     Data Reviewed:   7/16/24 note with Zakia Santizo PharmD  4/23/24 SW note from Pauline Schultz, [facility]    Impression:  Remington Orr is a 58 year old male with idiopathic Parkinson's disease. Overall, he has noticed progression of symptoms and particularly struggles with wearing off. He remains on a fairly low dose of carbidopa/levodopa. We discussed several ways to address wearing off. We will plan to start by increasing the dose to 1.5 tabs for all doses during the day to see if we can achieve a steadier concentration. Additional considerations include trial of Rytary vs addition of Entacapone. He has seen Zakia Santizo PharmD in the past. I sent a message to update her. He will try increased dose but is open to considering Rytary in the future.     Mr. Orr has continued to work as a  but is struggling to keep up with work and requiring significant assistance from coworkers. Discussed that Parkinson's disease is a progressive neurodegenerative disorder that particularly causes problems with mobility. I expect him to continue to struggle at work even if we are able to improve wearing off times. In particular, I am concerned about his ability to navigate tables with reported freezing. Multitasking and mobility are particularly challenging for people with Parkinson's disease and working as a  will be increasingly difficult. I support his pursuit of disability.    Encouraged him to continue doing a  fantastic job staying active.     Recommendations:     - Increase immediate release CD/LD as below     8am 11am 2pm 5pm 8pm 11:30pm   Carbidopa/Levodopa  mg 1.5 1.5 1.5 1.5 1.5     Carbidopa/Levodopa  mg CR          1     - Discussed options to treat wearing off - reviewed entacapone vs Rytary   - Will increase dose as above and revisit these options in a month or so   - Messaged Zakia Santizo, [facility] pharmacy to update   - Will plan for [facility] follow up between now and our next appointment  - Agree with pursuit of disability for Parkinson's disease diagnosis  - Encouraged continued activity    Follow up in 3 months, 30 minutes    Time Spent: 57 minutes spent on the date of the encounter doing chart review, history and exam, documentation and further activities as noted above    The longitudinal plan of care for the diagnosis(es)/condition(s) as documented were addressed during this visit. Due to the added complexity in care, I will continue to support Remington in the subsequent management and with ongoing continuity of care.    Karen Jansen MD  Movement Disorders Fellow    CC: Parkinson's disease      Again, thank you for allowing me to participate in the care of your patient.        Sincerely,        Karen Jansen MD

## 2024-12-19 DIAGNOSIS — G20.A2 PARKINSON'S DISEASE WITHOUT DYSKINESIA, WITH FLUCTUATING MANIFESTATIONS (H): ICD-10-CM

## 2024-12-19 RX ORDER — CARBIDOPA AND LEVODOPA 25; 100 MG/1; MG/1
1.5 TABLET ORAL
Qty: 675 TABLET | Refills: 0 | Status: SHIPPED | OUTPATIENT
Start: 2024-12-19

## 2024-12-19 NOTE — TELEPHONE ENCOUNTER
Pending Prescriptions:                       Disp   Refills    carbidopa-levodopa (SINEMET)  MG ta*675 ta*0            Sig: Take 1.5 tablets by mouth 5 times daily.   Last O/V 11/18/24  Next 2/10/25

## 2025-01-22 ENCOUNTER — PATIENT OUTREACH (OUTPATIENT)
Dept: CARE COORDINATION | Facility: CLINIC | Age: 60
End: 2025-01-22
Payer: COMMERCIAL

## 2025-01-22 DIAGNOSIS — Z71.89 COUNSELING AND COORDINATION OF CARE: Primary | ICD-10-CM

## 2025-01-22 NOTE — PROGRESS NOTES
Social Work Follow-Up  [facility]    Data/Intervention:  Patient Name:  Remington Orr  /Age:  1965 (59 year old)    Reason for Follow-Up:  SSDI application questions    Collaborated With:    -Remington    Intervention/Education/Resources Provided:  Pt had several questions about the SSDI application. He started it and can't get back in. He also wonders about specific questions and if he can call me to ask if he filled it out correctly.  I provided the Soc sec phone # so he can request technical help to see if he can get back into the application. I indicated I could help answer questions but that I'm not an expert on completing the document and have no particular knowledge about the best way to fill it out.   Discussed that he can hire an  but there are costs to consider. He can also apply himself now and get an  if denied and needs to appeal.    Assessment/Plan:  He will get re started on it and determine if he needs my advice.     Previously provided patient/family with writer's contact information and availability.       Pauline Schultz, KATHERIN, [facility] Hospital[facility]    [facility]  [address] [place] [address], [id number]  [place] [address]    [phone]

## 2025-02-24 ENCOUNTER — TELEPHONE (OUTPATIENT)
Dept: NEUROLOGY | Facility: CLINIC | Age: 60
End: 2025-02-24
Payer: COMMERCIAL

## 2025-02-24 NOTE — TELEPHONE ENCOUNTER
[facility] Call Center    Phone Message    May a detailed message be left on voicemail: yes     Reason for Call: Symptoms or Concerns     If patient has red-flag symptoms, warm transfer to triage line    Current symptom or concern: This weekend pt expieranced some cloudiness in his right eye. Pt states is started Friday evening. Pt states it was not present on Saturday during the day bit came back Saturday night. Pt states same for Sunday and into today. PT states he has no other symptoms other than some slight cloudiness to the right eye.Pt is wondering if this is regards to his Parkison's disease.     Please contact Pt to discuss and advise at [phone]    Symptoms have been present for:  3 days    Has patient previously been seen for this? Yes    By Dr. Jansen     Date: 2/24/25    Are there any new or worsening symptoms? No    Action Taken: Other:  Neurology     Travel Screening: Not Applicable

## 2025-02-24 NOTE — TELEPHONE ENCOUNTER
Returned call to Remington Orr:     He reports that starting Friday night that it seemed like he had little flashes going off in his right eye. He didn't think too much about it, went to sleep and woke up without any of the flashes.     He went to work Sat night and worked most of his shift. He then started noticing floaters and felt like he couldn't see print quite as well. He ended up figuring out that his right eye is cloudy. This has been the same since Saturday night.    No pain, redness, tearing of his right eye. No headache, jaw claudication.     Advised that PD can causes dry eyes as well as convergence insufficiency. It would not cause monocular vision loss or floaters. I am concerned a primary ophthalmologic disease affecting his right eye based on this description.     He does not have an eye doctor currently. He has glasses, most recently saw an optometrist for this.     He has an appointment with an ophthalmologist at 10am tomorrow.     Advised that this is likely the soonest available outpatient option. If his vision changes further, advised that he should present to the emergency room for urgent ophthalmological evaluation.     All questions addressed.     Karen Jansen MD  Movement Disorders Fellow

## 2025-04-07 ENCOUNTER — OFFICE VISIT (OUTPATIENT)
Dept: NEUROLOGY | Facility: CLINIC | Age: 60
End: 2025-04-07
Payer: COMMERCIAL

## 2025-04-07 VITALS
BODY MASS INDEX: 23.39 KG/M2 | HEART RATE: 83 BPM | WEIGHT: 163.38 LBS | SYSTOLIC BLOOD PRESSURE: 110 MMHG | OXYGEN SATURATION: 98 % | DIASTOLIC BLOOD PRESSURE: 72 MMHG | HEIGHT: 70 IN

## 2025-04-07 DIAGNOSIS — G20.A2 PARKINSON'S DISEASE WITHOUT DYSKINESIA, WITH FLUCTUATING MANIFESTATIONS (H): Primary | ICD-10-CM

## 2025-04-07 PROCEDURE — 3074F SYST BP LT 130 MM HG: CPT | Performed by: STUDENT IN AN ORGANIZED HEALTH CARE EDUCATION/TRAINING PROGRAM

## 2025-04-07 PROCEDURE — 3078F DIAST BP <80 MM HG: CPT | Performed by: STUDENT IN AN ORGANIZED HEALTH CARE EDUCATION/TRAINING PROGRAM

## 2025-04-07 PROCEDURE — 99214 OFFICE O/P EST MOD 30 MIN: CPT | Performed by: STUDENT IN AN ORGANIZED HEALTH CARE EDUCATION/TRAINING PROGRAM

## 2025-04-07 ASSESSMENT — UNIFIED PARKINSONS DISEASE RATING SCALE (UPDRS)
RIGIDITY_RLE: (1) SLIGHT: RIGIDITY ONLY DETECTED WITH ACTIVATION MANEUVER.
CONSTANCY_TREMOR_ATREST: (4) SEVERE: TREMOR AT REST IS PRESENT > 75% OF THE ENTIRE EXAMINATION PERIOD.
FACIAL_EXPRESSION: (2) MILD: IN ADDITION TO DECREASED EYE-BLINK FREQUENCY, MASKED FACIES PRESENT IN THE LOWER FACE AS WELL, NAMELY FEWER MOVEMENTS AROUND THE MOUTH, SUCH AS LESS SPONTANEOUS SMILING, BUT LIPS NOT PARTED.
FINGER_TAPPING_LEFT: (1) SLIGHT: ANY OF THE FOLLOWING: A) THE REGULAR RHYTHM IS BROKEN WITH ONE WITH ONE OR TWO INTERRUPTIONS OR HESITATIONS OF THE MOVEMENT  B) SLIGHT SLOWING  C) THE AMPLITUDE DECREMENTS NEAR THE END OF THE 10 MOVEMENTS.
RIGIDITY_RUE: (2) MILD: RIGIDITY DETECTED WITHOUT THE ACTIVATION MANEUVER. FULL RANGE OF MOTION IS EASILY ACHIEVED.
TOTAL_SCORE_LEFT: 10
AMPLITUDE_LIP_JAW: (0) NORMAL: NO TREMOR.
ARISING_CHAIR: (0) NORMAL: NO PROBLEMS. ABLE TO ARISE QUICKLY WITHOUT HESITATION.
RIGIDITY_NECK: (2) MILD: RIGIDITY DETECTED WITHOUT THE ACTIVATION MANEUVER. FULL RANGE OF MOTION IS EASILY ACHIEVED.
PARKINSONS_MEDS: ON
MOVEMENTS_INTERFERE_WITH_RATINGS: NO
RIGIDITY_LUE: (2) MILD: RIGIDITY DETECTED WITHOUT THE ACTIVATION MANEUVER. FULL RANGE OF MOTION IS EASILY ACHIEVED.
RIGIDITY_LLE: (0) NORMAL: NO RIGIDITY.
LEG_AGILITY_RIGHT: (2) MILD: ANY OF THE FOLLOWING: A) 3 TO 5 INTERRUPTIONS DURING TAPPING  B) MILD SLOWING  C) THE AMPLITUDE DECREMENTS MIDWAY IN THE 10-MOVEMENT SEQUENCE.
POSTURAL_STABILITY: (0) NORMAL: NO PROBLEMS. RECOVERS WITH ONE OR TWO STEPS.
POSTURE: (1) SLIGHT: NOT QUITE ERECT, BUT COULD BE NORMAL FOR OLDER PERSONS.
LEG_AGILITY_LEFT: (0) NORMAL: NO PROBLEMS.
GAIT: (1) SLIGHT: INDEPENDENT WALKING WITH MINOR GAIT IMPAIRMENT.
FINGER_TAPPING_RIGHT: (1) SLIGHT: ANY OF THE FOLLOWING: A) THE REGULAR RHYTHM IS BROKEN WITH ONE WITH ONE OR TWO INTERRUPTIONS OR HESITATIONS OF THE MOVEMENT  B) SLIGHT SLOWING  C) THE AMPLITUDE DECREMENTS NEAR THE END OF THE 10 MOVEMENTS.
DYSKINESIAS_PRESENT: NO
TOETAPPING_RIGHT: (2) MILD: ANY OF THE FOLLOWING: A) 3 TO 5 INTERRUPTIONS DURING TAPPING  B) MILD SLOWING  C) THE AMPLITUDE DECREMENTS MIDWAY IN THE 10-MOVEMENT SEQUENCE.
PRONATION_SUPINATION_RIGHT: (1) SLIGHT: ANY OF THE FOLLOWING: A) THE REGULAR RHYTHM IS BROKEN WITH ONE WITH ONE OR TWO INTERRUPTIONS OR HESITATIONS OF THE MOVEMENT  B) SLIGHT SLOWING  C) THE AMPLITUDE DECREMENTS NEAR THE END OF THE 10 MOVEMENTS.
TOETAPPING_LEFT: (1) SLIGHT: ANY OF THE FOLLOWING: A) THE REGULAR RHYTHM IS BROKEN WITH ONE WITH ONE OR TWO INTERRUPTIONS OR HESITATIONS OF THE MOVEMENT  B) SLIGHT SLOWING  C) THE AMPLITUDE DECREMENTS NEAR THE END OF THE 10 MOVEMENTS.
AMPLITUDE_RLE: (1) SLIGHT: < 1 CM IN MAXIMAL AMPLITUDE.
SPONTANEITY_OF_MOVEMENT: (1) SLIGHT: SLIGHT GLOBAL SLOWNESS AND POVERTY OF SPONTANEOUS MOVEMENTS.
HANDMOVEMENTS_RIGHT: (1) SLIGHT: ANY OF THE FOLLOWING: A) THE REGULAR RHYTHM IS BROKEN WITH ONE WITH ONE OR TWO INTERRUPTIONS OR HESITATIONS OF THE MOVEMENT  B) SLIGHT SLOWING  C) THE AMPLITUDE DECREMENTS NEAR THE END OF THE 10 MOVEMENTS.
FREEZING_GAIT: (0) NORMAL: NO FREEZING.
TOTAL_SCORE: 15
SPEECH: (0) NORMAL: NO SPEECH PROBLEMS.
AMPLITUDE_LLE: (0) NORMAL: NO TREMOR.
HANDMOVEMENTS_LEFT: (1) SLIGHT: ANY OF THE FOLLOWING: A) THE REGULAR RHYTHM IS BROKEN WITH ONE WITH ONE OR TWO INTERRUPTIONS OR HESITATIONS OF THE MOVEMENT  B) SLIGHT SLOWING  C) THE AMPLITUDE DECREMENTS NEAR THE END OF THE 10 MOVEMENTS.
AMPLITUDE_RUE: (2) MILD: > 1 CM BUT < 3 CM IN MAXIMAL AMPLITUDE.
TOTAL_SCORE: 36
AXIAL_SCORE: 7
PRONATION_SUPINATION_LEFT: (1) SLIGHT: ANY OF THE FOLLOWING: A) THE REGULAR RHYTHM IS BROKEN WITH ONE WITH ONE OR TWO INTERRUPTIONS OR HESITATIONS OF THE MOVEMENT  B) SLIGHT SLOWING  C) THE AMPLITUDE DECREMENTS NEAR THE END OF THE 10 MOVEMENTS.
AMPLITUDE_LUE: (2) MILD: > 1 CM BUT < 3 CM IN MAXIMAL AMPLITUDE.

## 2025-04-07 NOTE — Clinical Note
He King,   I'm seeing Remington in clinic today. We called over to the [place] [facility] pharmacy - 30 day supply of Rytary would be $1470. I'm not sure if there are additional patient support options we have. I imaging Crexont would be more expensive. I also spoke with Remington about Vyalev. I'm not sure if that would be an option with his insurance?    It looks like Remington never made a follow up appointment with you - he missed the [employer] message you sent. Would it be possible to make a follow up appointment with him? I will also give him the [facility] pharmacy line.   Thanks! Karen

## 2025-04-07 NOTE — LETTER
2025      Remington Orr  [address] [place] [address]      Dear Colleague,    Thank you for referring your patient, Remington Orr, to the [facility]. Please see a copy of my visit note below.    Department of Neurology  Movement Disorders Division   Follow-up Note    Patient: Remington Orr   MRN: [id number]   : 1965   Date of Visit: 2025     Chief Complaint:  Remington Orr is a 59 year old male who returns to clinic for follow up of Parkinson's disease    Interval History:  Mr. Orr was last seen 24 at which time we discussed trying Rytary vs Entacapone to address wearing off.     Mr. Orr presents by himself.    He reports that his tremor is worse since getting here - he attributes this to nervousness from the appointment and his partner not being here.     He is still feeling like his medications are wearing off. However, last night he forgot to take his dose on time and felt like he was feeling ok. However, he has also noticed that he is still feeling ok toward the end of a dose, but has some wearing off as meds are kicking. He feels like his pills are lasting around 2.5 hours currently.     He has not tried filling his Rytary prescription.     He tried taking 1.5 tablets/dose but felt like that was too much. He took that dose at a stationary bike class and felt he got dyskinesias in his leg. He has been taking about 1 and 1/3 for three doses during the day.      He is laying down and resting in the early afternoon. Sometimes falling asleep, sometimes not. He feels better after resting.     His right leg never feels like it fully relaxes. This tends to get better as the medication is kicking in. It feels like it relaxes when he lays down.     Protein seems to impact dose efficacy. He feels like symptoms are a little bit worse when he takes pills near protein timing.     Called [facility]:   Confirmed that Rytary would  be $1470 for a 30 day with [employer]    Parkinson Disease Motor Symptom Review:  Motor fluctuations: Yes  Dyskinesia: Denies significant dyskinesias. Lower extremity dyskinesia with 1.5 tabs with every dose.   Wearing off:  Wearing off with ~50% of doses.   Freezing of gait: Freezing has been pretty good. Tight spaces make this work.   Tremor: Tremor is worse today - attributes to nervousness. He also likely had some wearing off.   Rigidity: Some stiffness  Bradykinesia: Feels like he is moving at a third of the speed as he had 5 years ago. He was doing yard work yesterday which was really challenging. Holding the leaf blower, turning, twisting, etc was challenging.      Parkinson's Disease Non-motor Symptom Review:  Mood - Mood has been pretty good. Occasionally feeling down about Parkinson's disease.   Cognitive impairment -  He has noticed he can be more forgetful - he has to write down orders at work more. He can struggle with multitasking.   Sleep disturbances - Dream enactment behavior hasn't increased - it hasn't been much of a bother lately.   GI symptoms - Constipation is well controlled with diet.   Balance - No falls, he is very careful.   Autonomic dysfunction - Lightheadedness when first getting up in the morning. Otherwise minimal. He has a water bottle on his bedside stand.   Hallucinations - No concern  Speech - Good  Swallowing - Denies dysphagia.  Salivation - Drooling every once in while.     He does boxing and cycling for PD classes.     He is still working. He hasn't been working as much this winter starting in January - [employer] has been slower. His income has been lower as he hasn't picked up as many shifts. He has worked 10 shifts at most since January.     He continues to struggle performing all tasks at work. He requires assistance from coworkers with a number of tasks including opening wine bottles, carrying trays and food to tables. Navigating the restaurant can be challenging due  to freezing.     He is working on disability for Parkinson's disease. He has just gotten two packets from social security. He has also gotten a call from [employer] about his insurance. He is not sure if this is due to his reduced income. He is working with a disability .     He has moved to [place].     Current Medications:   Current Outpatient Medications   Medication Sig Dispense Refill     carbidopa-levodopa (SINEMET CR)  MG CR tablet Take 1 tablet by mouth at bedtime. 30 tablet 5     carbidopa-levodopa (SINEMET)  MG tablet Take 1.5 tablets by mouth 5 times daily. 675 tablet 0     carbidopa-levodopa ER (RYTARY) 23.75-95 MG CPCR per CR capsule Take 4 capsules by mouth 3 times daily 360 capsule 11         8am 11am 2pm 5pm 8pm 11:30pm   Carbidopa/Levodopa  mg 1.3 1.3 1.5 1.5 1.3     Carbidopa/Levodopa  mg CR          1        Allergies: has No Known Allergies.    Past Medical History:  Past Medical History:   Diagnosis Date     Parkinson's disease (H)        Past Surgical History:  Past Surgical History:   Procedure Laterality Date     wisdom teeth         Social History:  Social History     Socioeconomic History     Marital status: Single   Occupational History     Occupation:    Tobacco Use     Smoking status: Never     Smokeless tobacco: Never   Substance and Sexual Activity     Alcohol use: Yes     Alcohol/week: 3.0 standard drinks of alcohol     Types: 3 Standard drinks or equivalent per week     Comment: 3 drinks a week     Drug use: No     Sexual activity: Yes     Partners: Female   Social History Narrative    Works as a  for 20+ years at [employer]. He generally enjoys his work.    Lives with girlfriend for the most part.  Two cats.        Has a good support system.    Feels safe in all environments.    Wears seatbelt 100% of the time    Wears helmet while biking.    Dentist appointment view.     Eye doctor due.  Wears glasses when biking.    Denies history of abuse,  "past or present, physical, sexual or emotional.    08/27/18    Katherine Mathews PA-C       Family History:  Family History   Problem Relation Age of Onset     Colon Cancer Father 76     Alzheimer Disease Father        Physical Exam:  The patient's  height is 1.783 m (5' 10.2\") and weight is 74.1 kg (163 lb 6 oz). His blood pressure is 110/72 and his pulse is 83. His oxygen saturation is 98%.      Last dose at 10:50am, several minutes into this appointment.     Neurological Examination:       4/7/2025    11:00 AM   UPDRS Motor Scale   Time: 11:24   Medication On   R Brain DBS: None   L Brain DBS: None   Dyskinesia (LID) No   Did LID interfere No   Speech 0   Facial Expression 2   Rigidity Neck 2   Rigidity RUE 2   Rigidity LUE 2   Rigidity RLE 1   Rigidity LLE 0   Finger Taps R 1   Finger Taps L 1   Hand Mvt R 1   Hand Mvt L 1   Pron-/Supinate R 1   Pron-/Supinate L 1   Toe Tap R 2   Toe Tap L 1   Leg Agility R 2   Leg Agility L 0   Arise From Chair 0   Gait 1   Gait Freezing 0   Postural Stability 0   Posture 1   Global Spont Mvt 1   Postural Tremor RUE 1   Postural Tremor LUE 1   Kinetic Tremor RUE 1   Kinetic Tremor LUE 1   Rest Tremor RUE 2   Rest Tremor LUE 2   Rest Tremor RLE 1   Rest Tremor LLE 0   Rest Tremor Lip/Jaw 0   Rest Tremor Constancy 4   Total Right 15   Total Left 10   Axial Total 7   Total 36     Gait: Reduced arm swing with reemergent tremor in the right hand. Slight shuffling, en bloc turn.     Data Reviewed:   1/22/24 SW note from Pauline Schultz, [facility]    Impression:  Remington Orr is a 58 year old male with idiopathic Parkinson's disease. He remains bothered by wearing off with approximately 50% of his doses. Since his last appointment, he tried increasing his other doses to 1.5 tabs but initially noticed some possible dyskinesias in his lower extremity and decided to adjust to ~1.25-1.3 tablets with his 8am, 11am, 8pm doses. We have previously discussed adding Rytary to decrease wearing off but he " is very concerned about possible discoloration of sweat, body fluids as a side effects and prefers to avoid it if possible. He has previously discussed switching to Rytary with myself and Christine Santizo, PharmD. He has a prescription in but never tried filling it. We called his pharmacy today and were told that a month prescription would cost $1470 which would be prohibitively expensive. I have messaged Christine to see if there is a more affordable way to fill this. Alternatively, [employer] may have more robust company support as this has been newly approved.     For bothersome wearing off, we also discussed possibility of Vyalev (subcutaneous pump) in the future. Advised that cost would likely be the main barrier to this option. Also briefly discussed DBS but he does not want to consider brain surgery.     Mr. Orr has continued to work as a  but is struggling to keep up with work and requiring significant assistance from coworkers. Parkinson's disease is a progressive neurodegenerative disorder that particularly causes problems with mobility. I expect him to continue to struggle at work even if we are able to improve wearing off times. In particular, I am concerned about his ability to navigate tables with reported freezing. Multitasking and mobility are particularly challenging for people with Parkinson's disease and working as a  will be increasingly difficult. I continue to support his pursuit of disability.    For sialorrhea - discussed using lozenges, hard candy, gum to prompt more frequent swallowing to clear saliva. He is not interested in medication or botulinum toxin injections to treat sialorrhea at this time.     Encouraged him to continue doing a fantastic job staying active.     Recommendations:     - Continue regimen CD/LD as below     8am 11am 2pm 5pm 8pm 11:30pm   Carbidopa/Levodopa  mg 1.3 1.3 1.5 1.5 1.3     Carbidopa/Levodopa  mg CR          1     - Current Rytary prescription is  $1470/month with insurance coverage through [facility]   - Messaged Christine Santizo, PharmD, with [facility] pharmacy to look into coverage   - Consider retrying increased dose of CD/LD (Sinemet)   - Discussed additional treatment options including entacapone, crexont, vyalev, DBS as above  - Agree with pursuit of disability for Parkinson's disease diagnosis  - Encouraged continued activity    Follow up in 3-4 months, 30 minutes    Time Spent: 45 minutes spent on the date of the encounter doing chart review, history and exam, documentation and further activities as noted above    Karen Jansen MD  Movement Disorders Fellow    CC: Parkinson's disease      Again, thank you for allowing me to participate in the care of your patient.        Sincerely,        Karen Jansen MD    Electronically signed

## 2025-04-07 NOTE — PATIENT INSTRUCTIONS
"Please continue to take your Carbidopa/Levodopa as you have been.     I have messaged Christine Santizo, PharmD, about the price of the Rytary that we checked on today. Our specialty pharmacy quoted $1470 for a 30 day supply of medication. If we are not able to figure out a way to get Rytary affordably, we may want to consider trying Entacapone. If this ends up not being a good fit, we can always stop it.     If you do not hear from Christine by the end of the week, please call to schedule follow up with her.     MTM scheduling line: [phone] or to schedule a MTM appt over [employer], click \"schedule an appointment\" under the visits tab at the top. Then choose Medication Therapy Management and follow the prompts to schedule.     Keep up the great job staying active!    Please let us know if there is anything else you need from our office to support your disability application.   "

## 2025-04-07 NOTE — NURSING NOTE
"Remington Orr is a 59 year old male who presents for:  Chief Complaint   Patient presents with    Parkinson     Parkinson follow up.         Initial Vitals:  /72   Pulse 83   Ht 1.783 m (5' 10.2\")   Wt 74.1 kg (163 lb 6 oz)   SpO2 98%   BMI 23.31 kg/m   Estimated body mass index is 23.31 kg/m  as calculated from the following:    Height as of this encounter: 1.783 m (5' 10.2\").    Weight as of this encounter: 74.1 kg (163 lb 6 oz).. Body surface area is 1.92 meters squared. BP completed using cuff size: regular    Jyotsna Rathai   "

## 2025-04-07 NOTE — PROGRESS NOTES
Department of Neurology  Movement Disorders Division   Follow-up Note    Patient: Remington Orr   MRN: [id number]   : 1965   Date of Visit: 2025     Chief Complaint:  Remington Orr is a 59 year old male who returns to clinic for follow up of Parkinson's disease    Interval History:  Mr. Orr was last seen 24 at which time we discussed trying Rytary vs Entacapone to address wearing off.     Mr. Orr presents by himself.    He reports that his tremor is worse since getting here - he attributes this to nervousness from the appointment and his partner not being here.     He is still feeling like his medications are wearing off. However, last night he forgot to take his dose on time and felt like he was feeling ok. However, he has also noticed that he is still feeling ok toward the end of a dose, but has some wearing off as meds are kicking. He feels like his pills are lasting around 2.5 hours currently.     He has not tried filling his Rytary prescription.     He tried taking 1.5 tablets/dose but felt like that was too much. He took that dose at a stationary bike class and felt he got dyskinesias in his leg. He has been taking about 1 and 1/3 for three doses during the day.      He is laying down and resting in the early afternoon. Sometimes falling asleep, sometimes not. He feels better after resting.     His right leg never feels like it fully relaxes. This tends to get better as the medication is kicking in. It feels like it relaxes when he lays down.     Protein seems to impact dose efficacy. He feels like symptoms are a little bit worse when he takes pills near protein timing.     Called [facility]:   Confirmed that Rytary would be $1470 for a 30 day with [employer]    Parkinson Disease Motor Symptom Review:  Motor fluctuations: Yes  Dyskinesia: Denies significant dyskinesias. Lower extremity dyskinesia with 1.5 tabs with every dose.   Wearing off:  Wearing off with  ~50% of doses.   Freezing of gait: Freezing has been pretty good. Tight spaces make this work.   Tremor: Tremor is worse today - attributes to nervousness. He also likely had some wearing off.   Rigidity: Some stiffness  Bradykinesia: Feels like he is moving at a third of the speed as he had 5 years ago. He was doing yard work yesterday which was really challenging. Holding the leaf blower, turning, twisting, etc was challenging.      Parkinson's Disease Non-motor Symptom Review:  Mood - Mood has been pretty good. Occasionally feeling down about Parkinson's disease.   Cognitive impairment -  He has noticed he can be more forgetful - he has to write down orders at work more. He can struggle with multitasking.   Sleep disturbances - Dream enactment behavior hasn't increased - it hasn't been much of a bother lately.   GI symptoms - Constipation is well controlled with diet.   Balance - No falls, he is very careful.   Autonomic dysfunction - Lightheadedness when first getting up in the morning. Otherwise minimal. He has a water bottle on his bedside stand.   Hallucinations - No concern  Speech - Good  Swallowing - Denies dysphagia.  Salivation - Drooling every once in while.     He does boxing and cycling for PD classes.     He is still working. He hasn't been working as much this winter starting in January - [employer] has been slower. His income has been lower as he hasn't picked up as many shifts. He has worked 10 shifts at most since January.     He continues to struggle performing all tasks at work. He requires assistance from coworkers with a number of tasks including opening wine bottles, carrying trays and food to tables. Navigating the restaurant can be challenging due to freezing.     He is working on disability for Parkinson's disease. He has just gotten two packets from social security. He has also gotten a call from [employer] about his insurance. He is not sure if this is due to his reduced income. He is  working with a disability .     He has moved to [place].     Current Medications:   Current Outpatient Medications   Medication Sig Dispense Refill    carbidopa-levodopa (SINEMET CR)  MG CR tablet Take 1 tablet by mouth at bedtime. 30 tablet 5    carbidopa-levodopa (SINEMET)  MG tablet Take 1.5 tablets by mouth 5 times daily. 675 tablet 0    carbidopa-levodopa ER (RYTARY) 23.75-95 MG CPCR per CR capsule Take 4 capsules by mouth 3 times daily 360 capsule 11         8am 11am 2pm 5pm 8pm 11:30pm   Carbidopa/Levodopa  mg 1.3 1.3 1.5 1.5 1.3     Carbidopa/Levodopa  mg CR          1        Allergies: has No Known Allergies.    Past Medical History:  Past Medical History:   Diagnosis Date    Parkinson's disease (H)        Past Surgical History:  Past Surgical History:   Procedure Laterality Date    wisdom teeth         Social History:  Social History     Socioeconomic History    Marital status: Single   Occupational History    Occupation:    Tobacco Use    Smoking status: Never    Smokeless tobacco: Never   Substance and Sexual Activity    Alcohol use: Yes     Alcohol/week: 3.0 standard drinks of alcohol     Types: 3 Standard drinks or equivalent per week     Comment: 3 drinks a week    Drug use: No    Sexual activity: Yes     Partners: Female   Social History Narrative    Works as a  for 20+ years at [employer]. He generally enjoys his work.    Lives with girlfriend for the most part.  Two cats.        Has a good support system.    Feels safe in all environments.    Wears seatbelt 100% of the time    Wears helmet while biking.    Dentist appointment view.     Eye doctor due.  Wears glasses when biking.    Denies history of abuse, past or present, physical, sexual or emotional.    08/27/18    Katherine Mathews PA-C       Family History:  Family History   Problem Relation Age of Onset    Colon Cancer Father 76    Alzheimer Disease Father        Physical Exam:  The patient's  height is  "1.783 m (5' 10.2\") and weight is 74.1 kg (163 lb 6 oz). His blood pressure is 110/72 and his pulse is 83. His oxygen saturation is 98%.      Last dose at 10:50am, several minutes into this appointment.     Neurological Examination:       4/7/2025    11:00 AM   UPDRS Motor Scale   Time: 11:24   Medication On   R Brain DBS: None   L Brain DBS: None   Dyskinesia (LID) No   Did LID interfere No   Speech 0   Facial Expression 2   Rigidity Neck 2   Rigidity RUE 2   Rigidity LUE 2   Rigidity RLE 1   Rigidity LLE 0   Finger Taps R 1   Finger Taps L 1   Hand Mvt R 1   Hand Mvt L 1   Pron-/Supinate R 1   Pron-/Supinate L 1   Toe Tap R 2   Toe Tap L 1   Leg Agility R 2   Leg Agility L 0   Arise From Chair 0   Gait 1   Gait Freezing 0   Postural Stability 0   Posture 1   Global Spont Mvt 1   Postural Tremor RUE 1   Postural Tremor LUE 1   Kinetic Tremor RUE 1   Kinetic Tremor LUE 1   Rest Tremor RUE 2   Rest Tremor LUE 2   Rest Tremor RLE 1   Rest Tremor LLE 0   Rest Tremor Lip/Jaw 0   Rest Tremor Constancy 4   Total Right 15   Total Left 10   Axial Total 7   Total 36     Gait: Reduced arm swing with reemergent tremor in the right hand. Slight shuffling, en bloc turn.     Data Reviewed:   1/22/24 SW note from Pauline Schultz, [facility]    Impression:  Remington Orr is a 58 year old male with idiopathic Parkinson's disease. He remains bothered by wearing off with approximately 50% of his doses. Since his last appointment, he tried increasing his other doses to 1.5 tabs but initially noticed some possible dyskinesias in his lower extremity and decided to adjust to ~1.25-1.3 tablets with his 8am, 11am, 8pm doses. We have previously discussed adding Rytary to decrease wearing off but he is very concerned about possible discoloration of sweat, body fluids as a side effects and prefers to avoid it if possible. He has previously discussed switching to Rytary with myself and Christine Santizo, PharmD. He has a prescription in but never tried " filling it. We called his pharmacy today and were told that a month prescription would cost $1470 which would be prohibitively expensive. I have messaged Christine to see if there is a more affordable way to fill this. Alternatively, [employer] may have more robust company support as this has been newly approved.     For bothersome wearing off, we also discussed possibility of Vyalev (subcutaneous pump) in the future. Advised that cost would likely be the main barrier to this option. Also briefly discussed DBS but he does not want to consider brain surgery.     Mr. Orr has continued to work as a  but is struggling to keep up with work and requiring significant assistance from coworkers. Parkinson's disease is a progressive neurodegenerative disorder that particularly causes problems with mobility. I expect him to continue to struggle at work even if we are able to improve wearing off times. In particular, I am concerned about his ability to navigate tables with reported freezing. Multitasking and mobility are particularly challenging for people with Parkinson's disease and working as a  will be increasingly difficult. I continue to support his pursuit of disability.    For sialorrhea - discussed using lozenges, hard candy, gum to prompt more frequent swallowing to clear saliva. He is not interested in medication or botulinum toxin injections to treat sialorrhea at this time.     Encouraged him to continue doing a fantastic job staying active.     Recommendations:     - Continue regimen CD/LD as below     8am 11am 2pm 5pm 8pm 11:30pm   Carbidopa/Levodopa  mg 1.3 1.3 1.5 1.5 1.3     Carbidopa/Levodopa  mg CR          1     - Current Rytary prescription is $1470/month with insurance coverage through [facility]   - Messaged Christine Santizo, PharmD, with [facility] pharmacy to look into coverage   - Consider retrying increased dose of CD/LD (Sinemet)   - Discussed additional treatment options  including entacapone, crexont, vyalev, DBS as above  - Agree with pursuit of disability for Parkinson's disease diagnosis  - Encouraged continued activity    Follow up in 3-4 months, 30 minutes    Time Spent: 45 minutes spent on the date of the encounter doing chart review, history and exam, documentation and further activities as noted above    Karen Jansen MD  Movement Disorders Fellow    CC: Parkinson's disease

## 2025-04-14 DIAGNOSIS — G20.A2 PARKINSON'S DISEASE WITHOUT DYSKINESIA, WITH FLUCTUATING MANIFESTATIONS (H): ICD-10-CM

## 2025-04-14 RX ORDER — CARBIDOPA AND LEVODOPA 50; 200 MG/1; MG/1
1 TABLET, EXTENDED RELEASE ORAL AT BEDTIME
Qty: 30 TABLET | Refills: 5 | Status: SHIPPED | OUTPATIENT
Start: 2025-04-14

## 2025-04-14 NOTE — TELEPHONE ENCOUNTER
Refill request for the following medication (s) listed below.    Pending Prescriptions:                       Disp   Refills    carbidopa-levodopa (SINEMET CR)  MG*30 tab*5            Sig: Take 1 tablet by mouth at bedtime.      Last office visit provider:  4/7/25  Next appointment scheduled: 8/12/25 With Dr. Shant Ramirez T'melisa for review and signature  Eric ANAND ATC

## 2025-05-10 ENCOUNTER — HEALTH MAINTENANCE LETTER (OUTPATIENT)
Age: 60
End: 2025-05-10

## 2025-05-22 DIAGNOSIS — G20.A2 PARKINSON'S DISEASE WITHOUT DYSKINESIA, WITH FLUCTUATING MANIFESTATIONS (H): ICD-10-CM

## 2025-05-22 RX ORDER — CARBIDOPA AND LEVODOPA 25; 100 MG/1; MG/1
1.5 TABLET ORAL
Qty: 675 TABLET | Refills: 3 | Status: SHIPPED | OUTPATIENT
Start: 2025-05-22

## 2025-05-22 NOTE — TELEPHONE ENCOUNTER
Neuroscience Clinic Task Note    TASK    Neurology Rx Refill  Medication Carbidopa-levodopa (SINEMET)  MG tablet    Dose    Last refill ordered (m/d/y) 12/19/24   Last quantity ordered 675   Last # refills 0   Last clinic visit with ordering provider (m/d/y) 4/7/25   Next clinic visit with ordering provider (m/d/y) 8/12/25   All pertinent protocol data (lab date/result)    Pertinent information from patient's message        FOLLOW-UP      ADDITIONAL COMMENTS      ELIAS SANCHEZ

## 2025-06-16 ENCOUNTER — TELEPHONE (OUTPATIENT)
Dept: NEUROLOGY | Facility: CLINIC | Age: 60
End: 2025-06-16
Payer: COMMERCIAL

## 2025-06-16 DIAGNOSIS — G20.A2 PARKINSON'S DISEASE WITHOUT DYSKINESIA, WITH FLUCTUATING MANIFESTATIONS (H): Primary | ICD-10-CM

## 2025-06-16 NOTE — TELEPHONE ENCOUNTER
Neuroscience Clinic Task Note    TASK    Neurology Rx Refill  Medication carbidopa-levodopa (SINEMET)  MG tablet  carbidopa-levodopa (SINEMET CR)  MG CR tablet   Dose      Last refill ordered (m/d/y) 05/22/25 for   04/14/25 for    Last quantity ordered 675 for   30 for    Last # refills 3 for   5 for    Last clinic visit with ordering provider (m/d/y) 04/07/25   Next clinic visit with ordering provider (m/d/y) 08/12/25 - Shant   All pertinent protocol data (lab date/result) NA   Pertinent information from patient's message NA       FOLLOW-UP  Follow up in 3-4 months, 30 minutes     ADDITIONAL COMMENTS  none    Rosy York [facility]

## 2025-06-16 NOTE — TELEPHONE ENCOUNTER
[facility] Call Center    Phone Message    May a detailed message be left on voicemail: yes     Reason for Call: Medication Question or concern regarding medication   Prescription Clarification  Name of Medication:   carbidopa-levodopa (SINEMET)  MG tablet  carbidopa-levodopa (SINEMET CR)  MG CR tablet  Prescribing Provider: Karen Jansen MD   Pharmacy: [id number] [address]    Patient calling in requesting to discuss possibly increasing the dosage of their medication, as it does not seem to be working. Please review as needed and reach back out to the patient to discuss at [phone].    Action Taken: Message routed to:  Other: [facility]

## 2025-06-17 RX ORDER — ENTACAPONE 200 MG/1
TABLET ORAL
Qty: 60 TABLET | Refills: 11 | Status: SHIPPED | OUTPATIENT
Start: 2025-06-17

## 2025-06-17 NOTE — TELEPHONE ENCOUNTER
Returned call to Remington Orr:     He reports that it seems like his medication is not lasting very long. He will get very slow and have difficulty moving.     He will be moving and feeling just fine and then suddenly the benefit seems to just be gone. There are times when they seem to be lasting the full three hours. He is more prone to wearing off in the morning. He seems to have long benefit when he is more active.        8am 11am 2pm 5pm 8pm 11:30pm   Carbidopa/Levodopa  mg 1.5 1.5 1.5 1.5 1.5     Carbidopa/Levodopa  mg CR           1      We discussed options to address wearing off. Options include adding entacapone or a dopamine agonist, Rytary, Crexont, Vyalev.     He is interested in trying Entacapone. He is more likely to get wearing off with after 11am dose and 5pm doses of CD/LD.     Start 200 mg Entacapone with 11am dose of CD/LD for three days  Then 200 mg Entacapone with 11am and 5pm dose of CD/LD    Follow up with Zakia Santizo, EricD, scheduled for 6/23/25    Karen Jansen MD  Movement Disorders Fellow

## 2025-06-23 ENCOUNTER — VIRTUAL VISIT (OUTPATIENT)
Dept: PHARMACY | Facility: OTHER | Age: 60
End: 2025-06-23
Attending: PSYCHIATRY & NEUROLOGY
Payer: COMMERCIAL

## 2025-06-23 DIAGNOSIS — G20.A2 PARKINSON'S DISEASE WITHOUT DYSKINESIA, WITH FLUCTUATING MANIFESTATIONS (H): Primary | ICD-10-CM

## 2025-06-23 RX ORDER — ENTACAPONE 200 MG/1
200 TABLET ORAL
Qty: 450 TABLET | Refills: 3 | Status: SHIPPED | OUTPATIENT
Start: 2025-06-23

## 2025-06-23 NOTE — PATIENT INSTRUCTIONS
"Recommendations from today's MTM visit:                                                    Medication list updated and reviewed   Please start entacapone. You can work your way up to 1 tablet with each dose of carbidopa/levodopa 25/100mg   Day 1-3  Medication 8am 11am 2pm 5pm 8pm 1130pm   Carbidopa/levodopa 25/100mg 1.5 tablets 1.5 tablets 1.5 tablets 1.5 tablet 1.5 tablets    Carbidopa/levodopa CR 50/200mg      1 tablet   Entacone 200mg  1 tablet  1 tablet       Days 4-6  Medication 8am 11am 2pm 5pm 8pm 1130pm   Carbidopa/levodopa 25/100mg 1.5 tablets 1.5 tablets 1.5 tablets 1.5 tablet 1.5 tablets    Carbidopa/levodopa CR 50/200mg      1 tablet   Entacone 200mg 1 tablet 1 tablet 1 tablet 1 tablet 1 tablet        Follow-up:   Appointments in Next Year      Jul 21, 2025 8:00 AM  Pharmacist Visit with Zakia Santizo RPH  [facility] ([facility]) [phone]     Aug 12, 2025 10:30 AM  (Arrive by 10:15 AM)  Return Movement Disorder with Julio César Gonsalez MD  [facility] ([facility] ) [phone]            It was great speaking with you today.  I value your experience and would be very thankful for your time in providing feedback in our clinic survey. In the next few days, you may receive an email or text message from [facility] [employer] with a link to a survey related to your  clinical pharmacist.\"     To schedule another MTM appointment, please call the clinic directly or you may call the MTM scheduling line at [phone].    My Clinical Pharmacist's contact information:                                                      Please feel free to contact me with any questions or concerns you have.      Zakia Santizo, Pharm.D., MPH  Medication Therapy Management Pharmacist   [facility]   "

## 2025-06-23 NOTE — PROGRESS NOTES
Medication Therapy Management (MTM) Encounter    ASSESSMENT:                            Medication Adherence/Access: No issues identified.    Parkinson's Disease:  Per formulary review, Rytary and corrects on appear to be nonformulary medications.  Therefore recommend patient start entacapone to help prevent wearing off prior to each dose of carbidopa/levodopa.  Other options were also discussed such as increasing dose of carbidopa/levodopa versus increasing medication frequency but patient would prefer to start entacapone at this time. Education provided on dosing, administration, and potential side effects of entacapone. All questions answered.     PLAN:                            Medication list updated and reviewed   Please start entacapone. You can work your way up to 1 tablet with each dose of carbidopa/levodopa 25/100mg   Day 1-3  Medication 8am 11am 2pm 5pm 8pm 1130pm   Carbidopa/levodopa 25/100mg 1.5 tablets 1.5 tablets 1.5 tablets 1.5 tablet 1.5 tablets    Carbidopa/levodopa CR 50/200mg      1 tablet   Entacone 200mg  1 tablet  1 tablet       Days 4-6  Medication 8am 11am 2pm 5pm 8pm 1130pm   Carbidopa/levodopa 25/100mg 1.5 tablets 1.5 tablets 1.5 tablets 1.5 tablet 1.5 tablets    Carbidopa/levodopa CR 50/200mg      1 tablet   Entacone 200mg 1 tablet 1 tablet 1 tablet 1 tablet 1 tablet        Follow-up:   Appointments in Next Year      Jul 21, 2025 8:00 AM  Pharmacist Visit with Zakia Santizo RPH  [facility] MTM ([facility]) [phone]     Aug 12, 2025 10:30 AM  (Arrive by 10:15 AM)  Return Movement Disorder with Julio César Gonsalez MD  [facility] ([facility] ) [phone]            SUBJECTIVE/OBJECTIVE:                          Remington Orr is a 60 year old male seen for a follow-up visit.       Reason for visit: MTM Follow Up.    Allergies/ADRs: Reviewed in chart  Past Medical  History: Reviewed in chart  Tobacco: He reports that he has never smoked. He has never used smokeless tobacco.  Alcohol: 3-4 beverages per week  Additional Providers: Neurologist    Medication Adherence/Access: Patient uses phone alarms to help with adherence.    Parkinson's Disease:     Medication 8am 11am 2pm 5pm 8pm 1130pm   Carbidopa/levodopa 25/100mg 1.5 tablets 1.5 tablets 1.5 tablets 1.5 tablet 1.5 tablets    Carbidopa/levodopa CR 50/200mg      1 tablet     Patient has been having some wearing off and feels as if his dose is not enough.  He has been previously prescribed entacapone but has not yet started this medication.  He is not able to predict when medication will wear off or if the medication will be effective as there are some doses he feels are not helpful.  He did attempt to get coverage for Rytary, but this was unaffordable.  He is not bothered by how many times he takes medications, more so by the wearing off.  Tolerating medication well with no concerns overnight and no medication side effects reported.    Today's Vitals: There were no vitals taken for this visit.  ----------------    I spent 24 minutes with this patient today. All changes were made via collaborative practice agreement with Karen Russell.     A summary of these recommendations was sent via [employer].    Zakia Santizo, Pharm.D., MPH  Medication Therapy Management Pharmacist   [facility]    Telemedicine Visit Details  The patient's medications can be safely assessed via a telemedicine encounter.  Type of service:  Telephone visit  Originating Location (pt. Location): Home    Distant Location (provider location):  Off-site  Start Time: 8:32 AM  End Time: 8:56 AM     Medication Therapy Recommendations  Parkinson's disease without dyskinesia, with fluctuating manifestations (H)   1 Rationale: Synergistic therapy - Needs additional medication therapy - Indication   Recommendation: Start Medication - ENTACAPONE    Status: Accepted per CPA   Identified Date: 6/23/2025 Completed Date: 6/23/2025

## 2025-06-27 ENCOUNTER — TELEPHONE (OUTPATIENT)
Dept: PHARMACY | Facility: OTHER | Age: 60
End: 2025-06-27
Payer: COMMERCIAL

## 2025-06-27 NOTE — TELEPHONE ENCOUNTER
Good afternoon Zakia,    Patient called our scheduling line this afternoon requesting to speak with you. He picked up the prescription for Entacapone yesterday and he has a question. He hasn't started it yet. He is wondering if he should start it now or wait until Monday. He has concerns that if he has questions over the weekend who he would contact. Please advise. Patient can be reached via [employer] or [employer] callback number [phone].     Thank you,  Madelyn Mccallum [facility]

## 2025-06-30 NOTE — CONFIDENTIAL NOTE
Responding to patient via [employer].     Zakia Santizo, Pharm.D., MPH  Medication Therapy Management Pharmacist   [facility]

## 2025-07-21 ENCOUNTER — VIRTUAL VISIT (OUTPATIENT)
Dept: PHARMACY | Facility: OTHER | Age: 60
End: 2025-07-21
Attending: PSYCHIATRY & NEUROLOGY
Payer: COMMERCIAL

## 2025-07-21 DIAGNOSIS — G20.A2 PARKINSON'S DISEASE WITHOUT DYSKINESIA, WITH FLUCTUATING MANIFESTATIONS (H): Primary | ICD-10-CM

## 2025-07-21 NOTE — PROGRESS NOTES
Medication Therapy Management (MTM) Encounter    ASSESSMENT:                            Medication Adherence/Access: No issues identified.    Parkinson's Disease:  Discussed that an additional dose of carbidopa/levodopa could be helpful for the toe-curling he experiences infrequently. Patient would prefer to not make any changes at this time given how infrequent they occur. This is reasonable for now. Discussed urine discoloration with both carbidopa/levodopa and entacapone and that it is not concerning. All questions answered.     PLAN:                            We discussed taking 1/2 tablet as needed for toe curling/involuntary movements but you would prefer to not make any medication changes at this time which is reasonable. This is an option in the future if you would like to give it a try    Follow-up:   Appointments in Next Year      Aug 12, 2025 10:30 AM  (Arrive by 10:15 AM)  Return Movement Disorder with Julio César Gonsalez MD  [facility] ([facility] ) [phone]     Jan 21, 2026 8:00 AM  Pharmacist Visit with Zakia Santizo [facility] ([facility] ) [phone]            SUBJECTIVE/OBJECTIVE:                          Remington Orr is a 60 year old male seen for a follow-up visit.       Reason for visit: MTM Follow Up.    Allergies/ADRs: Reviewed in chart  Past Medical History: Reviewed in chart  Tobacco: He reports that he has never smoked. He has never used smokeless tobacco.  Alcohol: 3-4 beverages per week  Additional Providers: Neurologist    Medication Adherence/Access: Patient uses phone alarms to help with adherence.     Parkinson's Disease:   Medication 8am 11am 2pm 5pm 8pm 1130pm   Carbidopa/levodopa 25/100mg 1.5 tablets 1.5 tablets 1.5 tablets 1.5 tablet 1.5 tablets     Carbidopa/levodopa CR 50/200mg           1 tablet   Entacone 200mg  1 tablet  1 tablet         Has found the entacapone to be helpful with just taking it twice a day.  Carbidopa/levodopa is lasting longer now that he has started entacapone. He has noticed his urine is now more yellow/orange and wonders if this is dangerous. Urine is changed to yellow/orange     He has been having some involuntary movements (was also present prior to entacapone). 430am will typically have the involuntary movements/toe curling but is able to relax and fall back asleep quickly. While biking, he may also have some toe curling in one foot. This can happen before or after his dose of carbidopa/levodopa. Relaxing his foot helps with this.     Today's Vitals: There were no vitals taken for this visit.  ----------------    I spent 25 minutes with this patient today. All changes were made via collaborative practice agreement with Karen Russell.     A summary of these recommendations was sent via [employer].    Zakia Santizo Pharm.D., MPH  Medication Therapy Management Pharmacist   [facility]    Telemedicine Visit Details  The patient's medications can be safely assessed via a telemedicine encounter.  Type of service:  Telephone visit  Originating Location (pt. Location): Home    Distant Location (provider location):  Off-site  Start Time: 8:00 AM  End Time: 8:25 AM     Medication Therapy Recommendations  No medication therapy recommendations to display

## 2025-07-22 NOTE — PATIENT INSTRUCTIONS
"Recommendations from today's MTM visit:                                                      We discussed taking 1/2 tablet as needed for toe curling/involuntary movements but you would prefer to not make any medication changes at this time which is reasonable. This is an option in the future if you would like to give it a try    Follow-up:   Appointments in Next Year      Aug 12, 2025 10:30 AM  (Arrive by 10:15 AM)  Return Movement Disorder with Julio César Gonsalez MD  [facility] ([facility] ) [phone]     Jan 21, 2026 8:00 AM  Pharmacist Visit with Zakia Santizo [facility] MTM ([facility] ) [phone]            It was great speaking with you today.  I value your experience and would be very thankful for your time in providing feedback in our clinic survey. In the next few days, you may receive an email or text message from [facility] [employer] with a link to a survey related to your  clinical pharmacist.\"     To schedule another MTM appointment, please call the clinic directly or you may call the MTM scheduling line at [phone].    My Clinical Pharmacist's contact information:                                                      Please feel free to contact me with any questions or concerns you have.      Zakia Santizo, Pharm.D., MPH  Medication Therapy Management Pharmacist   [facility]   "

## 2025-08-11 ENCOUNTER — TELEPHONE (OUTPATIENT)
Dept: NEUROLOGY | Facility: CLINIC | Age: 60
End: 2025-08-11
Payer: COMMERCIAL

## 2025-08-12 ENCOUNTER — OFFICE VISIT (OUTPATIENT)
Dept: NEUROLOGY | Facility: CLINIC | Age: 60
End: 2025-08-12
Payer: COMMERCIAL

## 2025-08-12 VITALS
OXYGEN SATURATION: 99 % | DIASTOLIC BLOOD PRESSURE: 65 MMHG | HEART RATE: 64 BPM | BODY MASS INDEX: 22.66 KG/M2 | WEIGHT: 158.8 LBS | SYSTOLIC BLOOD PRESSURE: 108 MMHG

## 2025-08-12 DIAGNOSIS — K59.00 CONSTIPATION, UNSPECIFIED CONSTIPATION TYPE: ICD-10-CM

## 2025-08-12 DIAGNOSIS — G20.A2 PARKINSON'S DISEASE WITHOUT DYSKINESIA, WITH FLUCTUATING MANIFESTATIONS (H): Primary | ICD-10-CM

## 2025-08-12 PROCEDURE — 3074F SYST BP LT 130 MM HG: CPT | Performed by: PSYCHIATRY & NEUROLOGY

## 2025-08-12 PROCEDURE — G2211 COMPLEX E/M VISIT ADD ON: HCPCS | Performed by: PSYCHIATRY & NEUROLOGY

## 2025-08-12 PROCEDURE — 3078F DIAST BP <80 MM HG: CPT | Performed by: PSYCHIATRY & NEUROLOGY

## 2025-08-12 PROCEDURE — 99215 OFFICE O/P EST HI 40 MIN: CPT | Performed by: PSYCHIATRY & NEUROLOGY

## 2025-08-12 ASSESSMENT — UNIFIED PARKINSONS DISEASE RATING SCALE (UPDRS)
LEG_AGILITY_RIGHT: (1) SLIGHT: ANY OF THE FOLLOWING: A) THE REGULAR RHYTHM IS BROKEN WITH ONE WITH ONE OR TWO INTERRUPTIONS OR HESITATIONS OF THE MOVEMENT  B) SLIGHT SLOWING  C) THE AMPLITUDE DECREMENTS NEAR THE END OF THE 10 MOVEMENTS.
ARISING_CHAIR: (0) NORMAL: NO PROBLEMS. ABLE TO ARISE QUICKLY WITHOUT HESITATION.
FREEZING_GAIT: (0) NORMAL: NO FREEZING.
HANDMOVEMENTS_LEFT: (2) MILD: ANY OF THE FOLLOWING: A) 3 TO 5 INTERRUPTIONS DURING TAPPING  B) MILD SLOWING  C) THE AMPLITUDE DECREMENTS MIDWAY IN THE 10-MOVEMENT SEQUENCE.
POSTURE: (2) MILD: DEFINITE FLEXION, SCOLIOSIS OR LEANING TO ONE SIDE, BUT CAN CORRECT POSTURE TO NORMAL WHEN ASKED.
PRONATION_SUPINATION_RIGHT: (2) MILD: ANY OF THE FOLLOWING: A) 3 TO 5 INTERRUPTIONS DURING TAPPING  B) MILD SLOWING  C) THE AMPLITUDE DECREMENTS MIDWAY IN THE 10-MOVEMENT SEQUENCE.
GAIT: (1) SLIGHT: INDEPENDENT WALKING WITH MINOR GAIT IMPAIRMENT.
TOTAL_SCORE: 13
TOETAPPING_RIGHT: (1) SLIGHT: ANY OF THE FOLLOWING: A) THE REGULAR RHYTHM IS BROKEN WITH ONE WITH ONE OR TWO INTERRUPTIONS OR HESITATIONS OF THE MOVEMENT  B) SLIGHT SLOWING  C) THE AMPLITUDE DECREMENTS NEAR THE END OF THE 10 MOVEMENTS.
CONSTANCY_TREMOR_ATREST: (2) MILD: TREMOR AT REST IS PRESENT 25-50% OF THE ENTIRE EXAMINATION PERIOD.
AMPLITUDE_LLE: (0) NORMAL: NO TREMOR.
MOVEMENTS_INTERFERE_WITH_RATINGS: NO
FINGER_TAPPING_LEFT: (2) MILD: ANY OF THE FOLLOWING: A) 3 TO 5 INTERRUPTIONS DURING TAPPING  B) MILD SLOWING  C) THE AMPLITUDE DECREMENTS MIDWAY IN THE 10-MOVEMENT SEQUENCE.
LEG_AGILITY_LEFT: (1) SLIGHT: ANY OF THE FOLLOWING: A) THE REGULAR RHYTHM IS BROKEN WITH ONE WITH ONE OR TWO INTERRUPTIONS OR HESITATIONS OF THE MOVEMENT  B) SLIGHT SLOWING  C) THE AMPLITUDE DECREMENTS NEAR THE END OF THE 10 MOVEMENTS.
RIGIDITY_RLE: (1) SLIGHT: RIGIDITY ONLY DETECTED WITH ACTIVATION MANEUVER.
TOTAL_SCORE_LEFT: 12
PRONATION_SUPINATION_LEFT: (1) SLIGHT: ANY OF THE FOLLOWING: A) THE REGULAR RHYTHM IS BROKEN WITH ONE WITH ONE OR TWO INTERRUPTIONS OR HESITATIONS OF THE MOVEMENT  B) SLIGHT SLOWING  C) THE AMPLITUDE DECREMENTS NEAR THE END OF THE 10 MOVEMENTS.
AMPLITUDE_LIP_JAW: (0) NORMAL: NO TREMOR.
FINGER_TAPPING_RIGHT: (1) SLIGHT: ANY OF THE FOLLOWING: A) THE REGULAR RHYTHM IS BROKEN WITH ONE WITH ONE OR TWO INTERRUPTIONS OR HESITATIONS OF THE MOVEMENT  B) SLIGHT SLOWING  C) THE AMPLITUDE DECREMENTS NEAR THE END OF THE 10 MOVEMENTS.
SPEECH: (0) NORMAL: NO SPEECH PROBLEMS.
RIGIDITY_RUE: (2) MILD: RIGIDITY DETECTED WITHOUT THE ACTIVATION MANEUVER. FULL RANGE OF MOTION IS EASILY ACHIEVED.
RIGIDITY_NECK: (2) MILD: RIGIDITY DETECTED WITHOUT THE ACTIVATION MANEUVER. FULL RANGE OF MOTION IS EASILY ACHIEVED.
POSTURAL_STABILITY: (0) NORMAL: NO PROBLEMS. RECOVERS WITH ONE OR TWO STEPS.
AMPLITUDE_LUE: (2) MILD: > 1 CM BUT < 3 CM IN MAXIMAL AMPLITUDE.
SPONTANEITY_OF_MOVEMENT: (1) SLIGHT: SLIGHT GLOBAL SLOWNESS AND POVERTY OF SPONTANEOUS MOVEMENTS.
TOETAPPING_LEFT: (1) SLIGHT: ANY OF THE FOLLOWING: A) THE REGULAR RHYTHM IS BROKEN WITH ONE WITH ONE OR TWO INTERRUPTIONS OR HESITATIONS OF THE MOVEMENT  B) SLIGHT SLOWING  C) THE AMPLITUDE DECREMENTS NEAR THE END OF THE 10 MOVEMENTS.
RIGIDITY_LLE: (0) NORMAL: NO RIGIDITY.
AMPLITUDE_RUE: (2) MILD: > 1 CM BUT < 3 CM IN MAXIMAL AMPLITUDE.
FACIAL_EXPRESSION: (2) MILD: IN ADDITION TO DECREASED EYE-BLINK FREQUENCY, MASKED FACIES PRESENT IN THE LOWER FACE AS WELL, NAMELY FEWER MOVEMENTS AROUND THE MOUTH, SUCH AS LESS SPONTANEOUS SMILING, BUT LIPS NOT PARTED.
PARKINSONS_MEDS: ON
DYSKINESIAS_PRESENT: NO
AXIAL_SCORE: 8
TOTAL_SCORE: 35
RIGIDITY_LUE: (2) MILD: RIGIDITY DETECTED WITHOUT THE ACTIVATION MANEUVER. FULL RANGE OF MOTION IS EASILY ACHIEVED.
AMPLITUDE_RLE: (0) NORMAL: NO TREMOR.
HANDMOVEMENTS_RIGHT: (2) MILD: ANY OF THE FOLLOWING: A) 3 TO 5 INTERRUPTIONS DURING TAPPING  B) MILD SLOWING  C) THE AMPLITUDE DECREMENTS MIDWAY IN THE 10-MOVEMENT SEQUENCE.